# Patient Record
Sex: FEMALE | Race: BLACK OR AFRICAN AMERICAN | Employment: FULL TIME | URBAN - METROPOLITAN AREA
[De-identification: names, ages, dates, MRNs, and addresses within clinical notes are randomized per-mention and may not be internally consistent; named-entity substitution may affect disease eponyms.]

---

## 2017-10-25 ENCOUNTER — OFFICE VISIT (OUTPATIENT)
Dept: FAMILY MEDICINE CLINIC | Facility: CLINIC | Age: 47
End: 2017-10-25

## 2017-10-25 VITALS
BODY MASS INDEX: 42.16 KG/M2 | WEIGHT: 250 LBS | HEIGHT: 64.75 IN | SYSTOLIC BLOOD PRESSURE: 136 MMHG | DIASTOLIC BLOOD PRESSURE: 74 MMHG | HEART RATE: 88 BPM

## 2017-10-25 DIAGNOSIS — Z00.00 ROUTINE GENERAL MEDICAL EXAMINATION AT A HEALTH CARE FACILITY: Primary | ICD-10-CM

## 2017-10-25 DIAGNOSIS — M54.50 ACUTE BILATERAL LOW BACK PAIN WITHOUT SCIATICA: ICD-10-CM

## 2017-10-25 DIAGNOSIS — E11.9 TYPE 2 DIABETES MELLITUS WITHOUT COMPLICATION, WITHOUT LONG-TERM CURRENT USE OF INSULIN (HCC): ICD-10-CM

## 2017-10-25 DIAGNOSIS — M53.3 SACRAL PAIN: ICD-10-CM

## 2017-10-25 PROCEDURE — 99386 PREV VISIT NEW AGE 40-64: CPT | Performed by: FAMILY MEDICINE

## 2017-10-25 PROCEDURE — 99203 OFFICE O/P NEW LOW 30 MIN: CPT | Performed by: FAMILY MEDICINE

## 2017-10-25 RX ORDER — GLIPIZIDE 5 MG/1
5 TABLET ORAL
COMMUNITY
End: 2018-04-11 | Stop reason: ALTCHOICE

## 2017-10-25 NOTE — PATIENT INSTRUCTIONS
-- call to schedule xray  ibuprofen 600mg-800mg up to 3x/day with meals as needed for pain (do not use for prolonged period)      -- come in for fasting bloodwork anytime that you are able to (8-10h no food, only water; lab here is open M-F, 8am-12)  --

## 2017-10-25 NOTE — PROGRESS NOTES
Abby Kaiser is a 52year old female who is here for Patient presents with:  Physical  Pain: burning pain in tailbone started a month ago       HPI:     Here for physical    Has pain in tailbone  -works as a  - was out for some time due to Natty Bledsoe status: Never Smoker                                                              Smokeless tobacco: Never Used                      Alcohol use:  No                    EXAM:   /74 (BP Location: Left arm, Patient Position: Sitting, Cuff Size: large) Visit:    Orders Placed This Encounter      Hemoglobin A1C [E]      CBC W Differential W Platelet [E]      Comp Metabolic Panel (14) [E]      Lipid Panel [E]      TSH W Reflex To Free T4 [E]      Vitamin D, 25-Hydroxy [E]    Meds This Visit:    No prescrip

## 2017-10-31 ENCOUNTER — HOSPITAL ENCOUNTER (OUTPATIENT)
Dept: GENERAL RADIOLOGY | Age: 47
Discharge: HOME | End: 2017-10-31
Attending: FAMILY MEDICINE
Payer: COMMERCIAL

## 2017-10-31 DIAGNOSIS — M53.3 SACRAL PAIN: ICD-10-CM

## 2017-10-31 DIAGNOSIS — M54.50 ACUTE BILATERAL LOW BACK PAIN WITHOUT SCIATICA: ICD-10-CM

## 2017-10-31 PROCEDURE — 72202 X-RAY EXAM SI JOINTS 3/> VWS: CPT | Performed by: FAMILY MEDICINE

## 2017-10-31 PROCEDURE — 72110 X-RAY EXAM L-2 SPINE 4/>VWS: CPT | Performed by: FAMILY MEDICINE

## 2017-11-08 ENCOUNTER — TELEPHONE (OUTPATIENT)
Dept: FAMILY MEDICINE CLINIC | Facility: CLINIC | Age: 47
End: 2017-11-08

## 2017-11-08 NOTE — TELEPHONE ENCOUNTER
----- Message from Yasmin Watson MD sent at 11/8/2017  2:04 PM CST -----  xrays look good, mild arthritis in lower back, nothing remarkable  -folllowup as planned

## 2017-11-17 ENCOUNTER — TELEPHONE (OUTPATIENT)
Dept: FAMILY MEDICINE CLINIC | Facility: CLINIC | Age: 47
End: 2017-11-17

## 2017-11-17 NOTE — TELEPHONE ENCOUNTER
Spoke to patient and she said her partner was positive for trichomoniasis  And he just got an antibiotic today from a previous doctor so pt is coming in on Monday to see Dr. Kylie Tong and she wants to be tested as well.

## 2017-11-17 NOTE — TELEPHONE ENCOUNTER
Emily Cartagena was just in with a appt with Lauran Habermann with Dr Oswald Aviles, she needs some testing done on Monday, she would like to speak with a nurse regarding this matter.  Please call Emily Cartagena at [phone]

## 2017-11-20 ENCOUNTER — OFFICE VISIT (OUTPATIENT)
Dept: FAMILY MEDICINE CLINIC | Facility: CLINIC | Age: 47
End: 2017-11-20

## 2017-11-20 VITALS
HEIGHT: 64.75 IN | HEART RATE: 80 BPM | BODY MASS INDEX: 41.99 KG/M2 | RESPIRATION RATE: 16 BRPM | TEMPERATURE: 98 F | WEIGHT: 249 LBS | SYSTOLIC BLOOD PRESSURE: 132 MMHG | DIASTOLIC BLOOD PRESSURE: 80 MMHG

## 2017-11-20 DIAGNOSIS — R10.2 SUPRAPUBIC PAIN: ICD-10-CM

## 2017-11-20 DIAGNOSIS — M54.50 CHRONIC BILATERAL LOW BACK PAIN WITHOUT SCIATICA: Primary | ICD-10-CM

## 2017-11-20 DIAGNOSIS — Z11.3 ROUTINE SCREENING FOR STI (SEXUALLY TRANSMITTED INFECTION): ICD-10-CM

## 2017-11-20 DIAGNOSIS — G89.29 CHRONIC BILATERAL LOW BACK PAIN WITHOUT SCIATICA: Primary | ICD-10-CM

## 2017-11-20 DIAGNOSIS — Z20.2 EXPOSURE TO TRICHOMONAS: ICD-10-CM

## 2017-11-20 PROCEDURE — 87086 URINE CULTURE/COLONY COUNT: CPT | Performed by: FAMILY MEDICINE

## 2017-11-20 PROCEDURE — 99214 OFFICE O/P EST MOD 30 MIN: CPT | Performed by: FAMILY MEDICINE

## 2017-11-20 RX ORDER — METRONIDAZOLE 500 MG/1
500 TABLET ORAL 2 TIMES DAILY
Qty: 14 TABLET | Refills: 0 | Status: SHIPPED | OUTPATIENT
Start: 2017-11-20 | End: 2018-02-27 | Stop reason: ALTCHOICE

## 2017-11-20 NOTE — PATIENT INSTRUCTIONS
Start anitbiotics  Protection for next 2 weeks  We will call with results in next couple of days  Followup if symptoms not improving  Call to schedule PT (call insurance first)    [address]    Lab appointments can now be gabriele

## 2017-11-20 NOTE — PROGRESS NOTES
Margo Rolle is a 52year old female here for Patient presents with:  STD: Room 1. Bloodwork , std testing. Sympt- pain in her lower abd , anal pain as well.  No flu vaccine       HPI:     Trichomonas exposure  -partner recently diagnosed  -has not been h CTAB, no wheezing  CV: RRR, no murmurs  Abd: soft, ND, mild suprapubic discomfort, +BS  Ext: full ROM  Psych: normal affect     ASSESSMENT/PLAN:     1. Chronic bilateral low back pain without sciatica  - OP REFERRAL TO [facility]    2.

## 2017-11-24 ENCOUNTER — TELEPHONE (OUTPATIENT)
Dept: FAMILY MEDICINE CLINIC | Facility: CLINIC | Age: 47
End: 2017-11-24

## 2017-11-24 NOTE — TELEPHONE ENCOUNTER
Pt informed of test results and recommendations. Pt verbalized understanding and had no questions at this time.

## 2017-11-24 NOTE — TELEPHONE ENCOUNTER
----- Message from Art Shaw MD sent at 11/23/2017  7:26 PM CST -----  Negative culture and urine tests  -f/u if symptoms not improving or worsening

## 2017-12-28 ENCOUNTER — TELEPHONE (OUTPATIENT)
Dept: FAMILY MEDICINE CLINIC | Facility: CLINIC | Age: 47
End: 2017-12-28

## 2017-12-28 NOTE — TELEPHONE ENCOUNTER
rx approved qty 30 days NR  Patient needs to complete labs for any further refills  Copy of lab orders mailed to patient with note to complete within 30 days for further refills of her medications

## 2018-02-27 ENCOUNTER — OFFICE VISIT (OUTPATIENT)
Dept: FAMILY MEDICINE CLINIC | Facility: CLINIC | Age: 48
End: 2018-02-27

## 2018-02-27 ENCOUNTER — LAB ENCOUNTER (OUTPATIENT)
Dept: LAB | Age: 48
End: 2018-02-27
Attending: FAMILY MEDICINE
Payer: COMMERCIAL

## 2018-02-27 VITALS
BODY MASS INDEX: 41.49 KG/M2 | HEIGHT: 64.5 IN | DIASTOLIC BLOOD PRESSURE: 70 MMHG | HEART RATE: 80 BPM | SYSTOLIC BLOOD PRESSURE: 134 MMHG | WEIGHT: 246 LBS

## 2018-02-27 DIAGNOSIS — E11.9 TYPE 2 DIABETES MELLITUS WITHOUT COMPLICATION, WITHOUT LONG-TERM CURRENT USE OF INSULIN (HCC): Primary | ICD-10-CM

## 2018-02-27 DIAGNOSIS — Z00.00 ROUTINE GENERAL MEDICAL EXAMINATION AT A HEALTH CARE FACILITY: ICD-10-CM

## 2018-02-27 DIAGNOSIS — Z11.3 ROUTINE SCREENING FOR STI (SEXUALLY TRANSMITTED INFECTION): ICD-10-CM

## 2018-02-27 DIAGNOSIS — J01.10 ACUTE FRONTAL SINUSITIS, RECURRENCE NOT SPECIFIED: ICD-10-CM

## 2018-02-27 DIAGNOSIS — E11.9 TYPE 2 DIABETES MELLITUS WITHOUT COMPLICATION, WITHOUT LONG-TERM CURRENT USE OF INSULIN (HCC): ICD-10-CM

## 2018-02-27 DIAGNOSIS — R35.0 URINARY FREQUENCY: ICD-10-CM

## 2018-02-27 LAB
25-HYDROXYVITAMIN D (TOTAL): 17.6 NG/ML (ref 30–100)
ALBUMIN SERPL-MCNC: 3.6 G/DL (ref 3.5–4.8)
ALP LIVER SERPL-CCNC: 86 U/L (ref 39–100)
ALT SERPL-CCNC: 13 U/L (ref 14–54)
AST SERPL-CCNC: 6 U/L (ref 15–41)
BASOPHILS # BLD AUTO: 0.04 X10(3) UL (ref 0–0.1)
BASOPHILS NFR BLD AUTO: 0.6 %
BILIRUB SERPL-MCNC: 0.3 MG/DL (ref 0.1–2)
BUN BLD-MCNC: 7 MG/DL (ref 8–20)
CALCIUM BLD-MCNC: 9.5 MG/DL (ref 8.3–10.3)
CHLORIDE: 99 MMOL/L (ref 101–111)
CO2: 28 MMOL/L (ref 22–32)
CREAT BLD-MCNC: 0.77 MG/DL (ref 0.55–1.02)
EOSINOPHIL # BLD AUTO: 0.09 X10(3) UL (ref 0–0.3)
EOSINOPHIL NFR BLD AUTO: 1.4 %
ERYTHROCYTE [DISTWIDTH] IN BLOOD BY AUTOMATED COUNT: 13.6 % (ref 11.5–16)
EST. AVERAGE GLUCOSE BLD GHB EST-MCNC: 324 MG/DL (ref 68–126)
GLUCOSE BLD-MCNC: 298 MG/DL (ref 70–99)
HBA1C MFR BLD HPLC: 12.9 % (ref ?–5.7)
HCT VFR BLD AUTO: 40.3 % (ref 34–50)
HGB BLD-MCNC: 12.7 G/DL (ref 12–16)
IMMATURE GRANULOCYTE COUNT: 0.02 X10(3) UL (ref 0–1)
IMMATURE GRANULOCYTE RATIO %: 0.3 %
LYMPHOCYTES # BLD AUTO: 2.25 X10(3) UL (ref 0.9–4)
LYMPHOCYTES NFR BLD AUTO: 36 %
M PROTEIN MFR SERPL ELPH: 7.8 G/DL (ref 6.1–8.3)
MCH RBC QN AUTO: 26.1 PG (ref 27–33.2)
MCHC RBC AUTO-ENTMCNC: 31.5 G/DL (ref 31–37)
MCV RBC AUTO: 82.9 FL (ref 81–100)
MONOCYTES # BLD AUTO: 0.51 X10(3) UL (ref 0.1–1)
MONOCYTES NFR BLD AUTO: 8.2 %
NEUTROPHIL ABS PRELIM: 3.34 X10 (3) UL (ref 1.3–6.7)
NEUTROPHILS # BLD AUTO: 3.34 X10(3) UL (ref 1.3–6.7)
NEUTROPHILS NFR BLD AUTO: 53.5 %
PLATELET # BLD AUTO: 260 10(3)UL (ref 150–450)
POTASSIUM SERPL-SCNC: 4.1 MMOL/L (ref 3.6–5.1)
RBC # BLD AUTO: 4.86 X10(6)UL (ref 3.8–5.1)
RED CELL DISTRIBUTION WIDTH-SD: 41.2 FL (ref 35.1–46.3)
SODIUM SERPL-SCNC: 136 MMOL/L (ref 136–144)
T PALLIDUM AB SER QL IA: NONREACTIVE
TSI SER-ACNC: 1.3 MIU/ML (ref 0.35–5.5)
WBC # BLD AUTO: 6.3 X10(3) UL (ref 4–13)

## 2018-02-27 PROCEDURE — 87389 HIV-1 AG W/HIV-1&-2 AB AG IA: CPT | Performed by: FAMILY MEDICINE

## 2018-02-27 PROCEDURE — 36415 COLL VENOUS BLD VENIPUNCTURE: CPT | Performed by: FAMILY MEDICINE

## 2018-02-27 PROCEDURE — 83036 HEMOGLOBIN GLYCOSYLATED A1C: CPT | Performed by: FAMILY MEDICINE

## 2018-02-27 PROCEDURE — 82306 VITAMIN D 25 HYDROXY: CPT | Performed by: FAMILY MEDICINE

## 2018-02-27 PROCEDURE — 86780 TREPONEMA PALLIDUM: CPT | Performed by: FAMILY MEDICINE

## 2018-02-27 PROCEDURE — 80050 GENERAL HEALTH PANEL: CPT | Performed by: FAMILY MEDICINE

## 2018-02-27 PROCEDURE — 99214 OFFICE O/P EST MOD 30 MIN: CPT | Performed by: FAMILY MEDICINE

## 2018-02-27 RX ORDER — FLUTICASONE PROPIONATE 50 MCG
2 SPRAY, SUSPENSION (ML) NASAL DAILY
Qty: 1 BOTTLE | Refills: 2 | Status: SHIPPED | OUTPATIENT
Start: 2018-02-27 | End: 2018-04-11

## 2018-02-27 RX ORDER — AMOXICILLIN AND CLAVULANATE POTASSIUM 875; 125 MG/1; MG/1
1 TABLET, FILM COATED ORAL 2 TIMES DAILY
Qty: 14 TABLET | Refills: 0 | Status: SHIPPED | OUTPATIENT
Start: 2018-02-27 | End: 2018-03-19

## 2018-02-27 NOTE — PATIENT INSTRUCTIONS
-- we will call with bloodwork results  -- continue metformin 3x/day  -- push fluids  -- start otc generic claritin or zyrtec daily  -- start flonase nasal spray daily  -- if not improving or worsening, start amoxicillin

## 2018-02-27 NOTE — PROGRESS NOTES
Jayla Koo is a 50year old female here for Patient presents with:  Diabetes  URI: headache, runny nose x 2 weeks   Vaginal Problem: urinary frequency, vaginal itching       HPI:     1.  Type 2 diabetes mellitus without complication, without long-term c Denies  --NEURO: Denies  --PSYCH: Denies  --HEME/LYMPH/IMMUN: Denies  --ENDO: Denies  --SKIN: Denies  All other systems reviewed are negative    PHYSICAL EXAM:   /70 (BP Location: Left arm, Patient Position: Sitting, Cuff Size: large)   Pulse 80   Ht

## 2018-03-07 ENCOUNTER — TELEPHONE (OUTPATIENT)
Dept: FAMILY MEDICINE CLINIC | Facility: CLINIC | Age: 48
End: 2018-03-07

## 2018-03-07 DIAGNOSIS — E55.9 VITAMIN D DEFICIENCY: Primary | ICD-10-CM

## 2018-03-07 RX ORDER — ERGOCALCIFEROL 1.25 MG/1
50000 CAPSULE ORAL WEEKLY
Qty: 12 CAPSULE | Refills: 0 | Status: SHIPPED | OUTPATIENT
Start: 2018-03-07 | End: 2018-06-05

## 2018-03-07 NOTE — TELEPHONE ENCOUNTER
----- Message from Jasiel Francisco MD sent at 3/6/2018  7:50 PM CST -----  Significantly elevated sugars - make appt to further discuss  -Vit D low - Vit D 50,000 units weekly x 12 wks, then recheck Vit D upon completion  -sti panel negative

## 2018-03-07 NOTE — TELEPHONE ENCOUNTER
lmom for pt with results/instructions to call office to schedule appt. Med sent to pharmacy and lab in system.

## 2018-03-07 NOTE — PROGRESS NOTES
Significantly elevated sugars - make appt to further discuss  -Vit D low - Vit D 50,000 units weekly x 12 wks, then recheck Vit D upon completion  -sti panel negative

## 2018-03-13 ENCOUNTER — TELEPHONE (OUTPATIENT)
Dept: FAMILY MEDICINE CLINIC | Facility: CLINIC | Age: 48
End: 2018-03-13

## 2018-03-13 NOTE — TELEPHONE ENCOUNTER
Pt states she was here last week and she said Dr. Rema Cash told her to call if she wasn't feeling better and he would call in an antibiotic, she said has taken amoxicillin in the past and that has worked for her. She also said the Flonase is not working.

## 2018-03-13 NOTE — TELEPHONE ENCOUNTER
Patient was given a printed rx at her ov on 2/27/18 for augmentin  Left message for patient that she was given a printed prescription that she will need to take to her pharmacy

## 2018-03-19 ENCOUNTER — TELEPHONE (OUTPATIENT)
Dept: FAMILY MEDICINE CLINIC | Facility: CLINIC | Age: 48
End: 2018-03-19

## 2018-03-19 RX ORDER — ACETAMINOPHEN AND CODEINE PHOSPHATE 300; 30 MG/1; MG/1
TABLET ORAL
Refills: 0 | COMMUNITY
Start: 2017-12-26 | End: 2018-04-25 | Stop reason: ALTCHOICE

## 2018-03-19 RX ORDER — AMOXICILLIN AND CLAVULANATE POTASSIUM 875; 125 MG/1; MG/1
1 TABLET, FILM COATED ORAL 2 TIMES DAILY
Qty: 14 TABLET | Refills: 0 | Status: SHIPPED | OUTPATIENT
Start: 2018-03-19 | End: 2018-03-26

## 2018-03-19 NOTE — TELEPHONE ENCOUNTER
rx for augmentin sent to [facility]   Left message for patient that rx has been sent to [facility]

## 2018-03-19 NOTE — TELEPHONE ENCOUNTER
Letty Viramontes is calling to see if Dr Edi Casillas can call in a prescription for Ginette's antibiotic, she lost the paper one she had, she would like it to go to the [place] [phone] in [facility].

## 2018-04-11 ENCOUNTER — OFFICE VISIT (OUTPATIENT)
Dept: FAMILY MEDICINE CLINIC | Facility: CLINIC | Age: 48
End: 2018-04-11

## 2018-04-11 VITALS
HEART RATE: 88 BPM | BODY MASS INDEX: 40.98 KG/M2 | DIASTOLIC BLOOD PRESSURE: 72 MMHG | WEIGHT: 243 LBS | HEIGHT: 64.5 IN | SYSTOLIC BLOOD PRESSURE: 138 MMHG

## 2018-04-11 DIAGNOSIS — E11.65 TYPE 2 DIABETES MELLITUS WITH HYPERGLYCEMIA, WITHOUT LONG-TERM CURRENT USE OF INSULIN (HCC): Primary | ICD-10-CM

## 2018-04-11 DIAGNOSIS — R00.2 PALPITATIONS: ICD-10-CM

## 2018-04-11 DIAGNOSIS — J01.10 ACUTE FRONTAL SINUSITIS, RECURRENCE NOT SPECIFIED: ICD-10-CM

## 2018-04-11 PROCEDURE — 99214 OFFICE O/P EST MOD 30 MIN: CPT | Performed by: FAMILY MEDICINE

## 2018-04-11 RX ORDER — GLIMEPIRIDE 1 MG/1
1 TABLET ORAL
Qty: 90 TABLET | Refills: 1 | Status: SHIPPED | OUTPATIENT
Start: 2018-04-11 | End: 2018-04-25 | Stop reason: ALTCHOICE

## 2018-04-11 RX ORDER — FLUTICASONE PROPIONATE 50 MCG
2 SPRAY, SUSPENSION (ML) NASAL DAILY
Qty: 1 BOTTLE | Refills: 2 | Status: SHIPPED | OUTPATIENT
Start: 2018-04-11 | End: 2018-07-10

## 2018-04-11 NOTE — PROGRESS NOTES
Yesenia Remy is a 50year old female here for Patient presents with:  Diabetes: Patient needs foot exam  Rapid Heart Beat: Mostly at night       HPI:     DM  -uncontrolled - a1c 12.9  -had stopped glipizide  -could limit juice in diet  -due for foot exam Oral Cap Take 1 capsule (50,000 Units total) by mouth once a week. Disp: 12 capsule Rfl: 0   MetFORMIN HCl 1000 MG Oral Tab Take 1 tablet (1,000 mg total) by mouth 2 (two) times daily.  Disp: 60 tablet Rfl: 0       Allergies:  No Known Allergies      ROS: breakfast.      Blood Glucose Monitoring Suppl (ERIKA CONTOUR NEXT EZ) w/Device Does not apply Kit 1 kit 0      Si Device by Other route 2 (two) times daily.  Dx: E11.6      Glucose Blood (ERIKA CONTOUR NEXT TEST) In Vitro Strip 100 strip 2      Sig: Ch

## 2018-04-25 ENCOUNTER — OFFICE VISIT (OUTPATIENT)
Dept: FAMILY MEDICINE CLINIC | Facility: CLINIC | Age: 48
End: 2018-04-25

## 2018-04-25 VITALS
HEIGHT: 64.5 IN | WEIGHT: 244 LBS | HEART RATE: 80 BPM | DIASTOLIC BLOOD PRESSURE: 70 MMHG | SYSTOLIC BLOOD PRESSURE: 128 MMHG | BODY MASS INDEX: 41.15 KG/M2

## 2018-04-25 DIAGNOSIS — R10.32 LLQ PAIN: Primary | ICD-10-CM

## 2018-04-25 DIAGNOSIS — E11.65 TYPE 2 DIABETES MELLITUS WITH HYPERGLYCEMIA, WITHOUT LONG-TERM CURRENT USE OF INSULIN (HCC): ICD-10-CM

## 2018-04-25 PROCEDURE — 99214 OFFICE O/P EST MOD 30 MIN: CPT | Performed by: FAMILY MEDICINE

## 2018-04-25 RX ORDER — GLIMEPIRIDE 2 MG/1
2 TABLET ORAL
Qty: 90 TABLET | Refills: 1 | Status: SHIPPED | OUTPATIENT
Start: 2018-04-25 | End: 2018-07-31

## 2018-04-25 RX ORDER — ACETAMINOPHEN AND CODEINE PHOSPHATE 300; 30 MG/1; MG/1
1 TABLET ORAL EVERY 4 HOURS PRN
Qty: 20 TABLET | Refills: 0 | Status: SHIPPED | OUTPATIENT
Start: 2018-04-25 | End: 2018-12-14

## 2018-04-25 NOTE — PATIENT INSTRUCTIONS
-- referral dept will call once CT scan is approved, try to call this afternoon to schedule and confirm with  that CT approved before making appt  -- tylenol with codeine as needed  -- increase glimeperide to 2mg daily each morning with food  --

## 2018-04-25 NOTE — PROGRESS NOTES
Cathy He is a 50year old female here for Patient presents with: Follow - Up: Follow up on blood sugars. Abdominal Pain: Low left abdominal pain x 4 days       HPI:     1.  LLQ pain  -started 4 days ago  -sharp pain  -comes and goes  -slight improv Does not apply Misc 1 lancet by Finger stick route daily. Dx: E11.6 Disp: 1 Box Rfl: 2   ergocalciferol 57780 units Oral Cap Take 1 capsule (50,000 Units total) by mouth once a week.  Disp: 12 capsule Rfl: 0   MetFORMIN HCl 1000 MG Oral Tab Take 1 tablet (1 Imaging & Referrals:  CT ABDOMEN+PELVIS(CONTRAST ONLY)(BXP=[id number])     Candace Healy MD

## 2018-05-23 ENCOUNTER — TELEPHONE (OUTPATIENT)
Dept: FAMILY MEDICINE CLINIC | Facility: CLINIC | Age: 48
End: 2018-05-23

## 2018-05-23 NOTE — TELEPHONE ENCOUNTER
I left a message on the patient's confidential voice mail stating that I am calling to inform her that she is due for a diabetic eye exam. I asked the patient to return my call at her earliest convenience.

## 2018-06-11 NOTE — TELEPHONE ENCOUNTER
I left a message on the patient's confidential voice mail stating that I am calling to remind her that she is due for her diabetic eye exam, if she hasn't completed it already.  I asked her to call me back at her earliest convenience so that I could place a

## 2018-07-31 ENCOUNTER — LAB ENCOUNTER (OUTPATIENT)
Dept: LAB | Age: 48
End: 2018-07-31
Attending: FAMILY MEDICINE
Payer: COMMERCIAL

## 2018-07-31 ENCOUNTER — OFFICE VISIT (OUTPATIENT)
Dept: FAMILY MEDICINE CLINIC | Facility: CLINIC | Age: 48
End: 2018-07-31
Payer: COMMERCIAL

## 2018-07-31 VITALS
DIASTOLIC BLOOD PRESSURE: 66 MMHG | BODY MASS INDEX: 41.15 KG/M2 | HEIGHT: 64.5 IN | WEIGHT: 244 LBS | SYSTOLIC BLOOD PRESSURE: 128 MMHG | HEART RATE: 78 BPM

## 2018-07-31 DIAGNOSIS — E11.65 TYPE 2 DIABETES MELLITUS WITH HYPERGLYCEMIA, WITHOUT LONG-TERM CURRENT USE OF INSULIN (HCC): Primary | ICD-10-CM

## 2018-07-31 DIAGNOSIS — M25.531 RIGHT WRIST PAIN: ICD-10-CM

## 2018-07-31 DIAGNOSIS — L85.3 DRY SKIN: ICD-10-CM

## 2018-07-31 DIAGNOSIS — E55.9 VITAMIN D DEFICIENCY: ICD-10-CM

## 2018-07-31 DIAGNOSIS — E11.65 TYPE 2 DIABETES MELLITUS WITH HYPERGLYCEMIA, WITHOUT LONG-TERM CURRENT USE OF INSULIN (HCC): ICD-10-CM

## 2018-07-31 LAB
ALBUMIN SERPL-MCNC: 3.3 G/DL (ref 3.5–4.8)
ALBUMIN/GLOB SERPL: 0.9 {RATIO} (ref 1–2)
ALP LIVER SERPL-CCNC: 85 U/L (ref 39–100)
ALT SERPL-CCNC: 20 U/L (ref 14–54)
ANION GAP SERPL CALC-SCNC: 9 MMOL/L (ref 0–18)
AST SERPL-CCNC: 12 U/L (ref 15–41)
BILIRUB SERPL-MCNC: 0.4 MG/DL (ref 0.1–2)
BUN BLD-MCNC: 7 MG/DL (ref 8–20)
BUN/CREAT SERPL: 9.5 (ref 10–20)
CALCIUM BLD-MCNC: 9 MG/DL (ref 8.3–10.3)
CHLORIDE SERPL-SCNC: 103 MMOL/L (ref 101–111)
CO2 SERPL-SCNC: 26 MMOL/L (ref 22–32)
CREAT BLD-MCNC: 0.74 MG/DL (ref 0.55–1.02)
CREAT UR-SCNC: <13 MG/DL
EST. AVERAGE GLUCOSE BLD GHB EST-MCNC: 335 MG/DL (ref 68–126)
GLOBULIN PLAS-MCNC: 3.7 G/DL (ref 2.5–3.7)
GLUCOSE BLD-MCNC: 247 MG/DL (ref 70–99)
HBA1C MFR BLD HPLC: 13.3 % (ref ?–5.7)
M PROTEIN MFR SERPL ELPH: 7 G/DL (ref 6.1–8.3)
MICROALBUMIN UR-MCNC: <0.5 MG/DL
OSMOLALITY SERPL CALC.SUM OF ELEC: 292 MOSM/KG (ref 275–295)
POTASSIUM SERPL-SCNC: 3.9 MMOL/L (ref 3.6–5.1)
SODIUM SERPL-SCNC: 138 MMOL/L (ref 136–144)
VIT D+METAB SERPL-MCNC: 15.7 NG/ML (ref 30–100)

## 2018-07-31 PROCEDURE — 83036 HEMOGLOBIN GLYCOSYLATED A1C: CPT | Performed by: FAMILY MEDICINE

## 2018-07-31 PROCEDURE — 82306 VITAMIN D 25 HYDROXY: CPT | Performed by: FAMILY MEDICINE

## 2018-07-31 PROCEDURE — 36415 COLL VENOUS BLD VENIPUNCTURE: CPT | Performed by: FAMILY MEDICINE

## 2018-07-31 PROCEDURE — 99214 OFFICE O/P EST MOD 30 MIN: CPT | Performed by: FAMILY MEDICINE

## 2018-07-31 PROCEDURE — 82570 ASSAY OF URINE CREATININE: CPT | Performed by: FAMILY MEDICINE

## 2018-07-31 PROCEDURE — 80053 COMPREHEN METABOLIC PANEL: CPT | Performed by: FAMILY MEDICINE

## 2018-07-31 PROCEDURE — 82043 UR ALBUMIN QUANTITATIVE: CPT | Performed by: FAMILY MEDICINE

## 2018-07-31 RX ORDER — GLIMEPIRIDE 2 MG/1
2 TABLET ORAL
Qty: 90 TABLET | Refills: 1 | Status: SHIPPED | OUTPATIENT
Start: 2018-07-31 | End: 2018-12-14

## 2018-07-31 RX ORDER — CYCLOBENZAPRINE HCL 10 MG
10 TABLET ORAL 3 TIMES DAILY
Qty: 30 TABLET | Refills: 1 | Status: SHIPPED | OUTPATIENT
Start: 2018-07-31 | End: 2018-08-20

## 2018-07-31 NOTE — PATIENT INSTRUCTIONS
Try over the counter nasonex or generic instead of flonase    Limit hot water and length of shower  Use vaseline on damp skin to seal in moisture in dry areas  Triamcinolone (instead of hydrocortisone) as needed    We will call with sugar levels    Back

## 2018-07-31 NOTE — PROGRESS NOTES
Wilmer Salomon is a 50year old female here for Patient presents with:  Diabetes  Derm Problem: dry skin. Pain: Right thumb pain       HPI:       1.  Type 2 diabetes mellitus with hyperglycemia, without long-term current use of insulin ([facility] [address]  -due for labs Box Rfl: 2   MetFORMIN HCl 1000 MG Oral Tab Take 1 tablet (1,000 mg total) by mouth 2 (two) times daily. Disp: 60 tablet Rfl: 0   Acetaminophen-Codeine #3 300-30 MG Oral Tab Take 1 tablet by mouth every 4 (four) hours as needed for Pain.  Disp: 20 tablet Rf

## 2018-08-05 NOTE — PROGRESS NOTES
Sugars very elevated, double up on glimeperide to 4mg every morning (always take with food)  Check sugars fasting every morning  Followup in 2 wks to recheck

## 2018-08-06 ENCOUNTER — TELEPHONE (OUTPATIENT)
Dept: FAMILY MEDICINE CLINIC | Facility: CLINIC | Age: 48
End: 2018-08-06

## 2018-08-06 NOTE — TELEPHONE ENCOUNTER
----- Message from Pascale Ball MD sent at 8/5/2018  4:32 PM CDT -----  Sugars very elevated, double up on glimeperide to 4mg every morning (always take with food)  Check sugars fasting every morning  Followup in 2 wks to recheck

## 2018-08-08 NOTE — TELEPHONE ENCOUNTER
Spoke to patient and she said she has only been taking 1mg of the glimepiride every morning with her Metformin so do you want her to start taking 2mg?     thanks

## 2018-09-27 ENCOUNTER — PATIENT OUTREACH (OUTPATIENT)
Dept: FAMILY MEDICINE CLINIC | Facility: CLINIC | Age: 48
End: 2018-09-27

## 2018-09-27 DIAGNOSIS — E11.65 TYPE 2 DIABETES MELLITUS WITH HYPERGLYCEMIA, WITHOUT LONG-TERM CURRENT USE OF INSULIN (HCC): Primary | ICD-10-CM

## 2018-09-27 NOTE — PROGRESS NOTES
I informed the patient that I am calling to remind her that she is due for her diabetic eye exam. The patient states that she will schedule an appointment with her ophthalmologist and have them fax the results to our office.

## 2018-11-01 ENCOUNTER — TELEPHONE (OUTPATIENT)
Dept: FAMILY MEDICINE CLINIC | Facility: CLINIC | Age: 48
End: 2018-11-01

## 2018-12-14 ENCOUNTER — OFFICE VISIT (OUTPATIENT)
Dept: FAMILY MEDICINE CLINIC | Facility: CLINIC | Age: 48
End: 2018-12-14
Payer: COMMERCIAL

## 2018-12-14 ENCOUNTER — HOSPITAL ENCOUNTER (OUTPATIENT)
Dept: CT IMAGING | Age: 48
Discharge: HOME | End: 2018-12-14
Attending: FAMILY MEDICINE
Payer: COMMERCIAL

## 2018-12-14 ENCOUNTER — TELEPHONE (OUTPATIENT)
Dept: FAMILY MEDICINE CLINIC | Facility: CLINIC | Age: 48
End: 2018-12-14

## 2018-12-14 VITALS
SYSTOLIC BLOOD PRESSURE: 138 MMHG | HEIGHT: 64.2 IN | HEART RATE: 82 BPM | WEIGHT: 240 LBS | DIASTOLIC BLOOD PRESSURE: 82 MMHG | BODY MASS INDEX: 40.97 KG/M2

## 2018-12-14 DIAGNOSIS — F41.9 ANXIETY: ICD-10-CM

## 2018-12-14 DIAGNOSIS — R10.32 LLQ PAIN: ICD-10-CM

## 2018-12-14 DIAGNOSIS — R30.9 PAINFUL URINATION: Primary | ICD-10-CM

## 2018-12-14 PROCEDURE — 87086 URINE CULTURE/COLONY COUNT: CPT | Performed by: FAMILY MEDICINE

## 2018-12-14 PROCEDURE — 81003 URINALYSIS AUTO W/O SCOPE: CPT | Performed by: FAMILY MEDICINE

## 2018-12-14 PROCEDURE — 82565 ASSAY OF CREATININE: CPT

## 2018-12-14 PROCEDURE — 99215 OFFICE O/P EST HI 40 MIN: CPT | Performed by: FAMILY MEDICINE

## 2018-12-14 PROCEDURE — 74177 CT ABD & PELVIS W/CONTRAST: CPT | Performed by: FAMILY MEDICINE

## 2018-12-14 RX ORDER — CIPROFLOXACIN 500 MG/1
500 TABLET, FILM COATED ORAL 2 TIMES DAILY
Qty: 14 TABLET | Refills: 0 | Status: SHIPPED | OUTPATIENT
Start: 2018-12-14 | End: 2019-02-27 | Stop reason: ALTCHOICE

## 2018-12-14 RX ORDER — ACETAMINOPHEN AND CODEINE PHOSPHATE 300; 30 MG/1; MG/1
1 TABLET ORAL EVERY 4 HOURS PRN
Qty: 20 TABLET | Refills: 0 | Status: SHIPPED | OUTPATIENT
Start: 2018-12-14 | End: 2019-03-20 | Stop reason: ALTCHOICE

## 2018-12-14 RX ORDER — GLIMEPIRIDE 2 MG/1
2 TABLET ORAL
Qty: 90 TABLET | Refills: 1 | Status: SHIPPED | OUTPATIENT
Start: 2018-12-14 | End: 2019-10-21

## 2018-12-14 NOTE — PATIENT INSTRUCTIONS
-- navigator will call you for counseling  -- referral dept will call you once CT scan is approved  -- start antibiotics  -- we will call you after CT scan

## 2018-12-14 NOTE — PROGRESS NOTES
Called [employer] for approval and got the approval.  Pt is scheduled for [place] at 5:45pm but has to be there by 4:15p. Left the address as well for pt and left msg that she has to be NPO 2 hours prior to testing.   Asked pt after reviewing message to call

## 2018-12-14 NOTE — PROGRESS NOTES
Trae Shahid is a 50year old female here for Patient presents with:  Pelvic Pain: Left side x 1 week  Anxiety: anxiety attack 2 days ago  Painful Urination: x 1 week      HPI:       1. Painful urination  2.  LLQ pain  -started 1 wk ago  -associated with route 2 (two) times daily. Dx: E11.6 Disp: 1 kit Rfl: 0   Glucose Blood (ERIKA CONTOUR NEXT TEST) In Vitro Strip Check sugars once daily. Dx: E11.6 Disp: 100 strip Rfl: 2   ERIKA MICROLET LANCETS Does not apply Misc 1 lancet by Finger stick route daily.  D total) by mouth 2 (two) times daily.    • glimepiride 2 MG Oral Tab 90 tablet 1     Sig: Take 1 tablet (2 mg total) by mouth daily with breakfast.   • MetFORMIN HCl 1000 MG Oral Tab 180 tablet 1     Sig: Take 1 tablet (1,000 mg total) by mouth 2 (two) times

## 2018-12-18 ENCOUNTER — TELEPHONE (OUTPATIENT)
Dept: FAMILY MEDICINE CLINIC | Facility: CLINIC | Age: 48
End: 2018-12-18

## 2018-12-18 NOTE — TELEPHONE ENCOUNTER
----- Message from Pascale Ball MD sent at 12/17/2018  5:04 PM CST -----  CT scan negative - followup if not improving, go to ER if worsening    lmom for pt with results/instructions. Asked pt after reviewing message to call office with any questions.

## 2018-12-18 NOTE — TELEPHONE ENCOUNTER
Patient informed UC was negative  Patient is asking for a recommendation for OBGYN  Dr Iqra Noguera advise

## 2018-12-24 ENCOUNTER — OFFICE VISIT (OUTPATIENT)
Dept: FAMILY MEDICINE CLINIC | Facility: CLINIC | Age: 48
End: 2018-12-24
Payer: COMMERCIAL

## 2018-12-24 VITALS
HEIGHT: 64.2 IN | WEIGHT: 245.25 LBS | TEMPERATURE: 98 F | SYSTOLIC BLOOD PRESSURE: 132 MMHG | DIASTOLIC BLOOD PRESSURE: 76 MMHG | BODY MASS INDEX: 41.87 KG/M2 | HEART RATE: 80 BPM

## 2018-12-24 DIAGNOSIS — R10.2 PELVIC PAIN: ICD-10-CM

## 2018-12-24 DIAGNOSIS — R30.0 DYSURIA: ICD-10-CM

## 2018-12-24 DIAGNOSIS — R10.32 LLQ PAIN: Primary | ICD-10-CM

## 2018-12-24 DIAGNOSIS — Z01.419 WELL WOMAN EXAM: ICD-10-CM

## 2018-12-24 PROCEDURE — 99214 OFFICE O/P EST MOD 30 MIN: CPT | Performed by: FAMILY MEDICINE

## 2018-12-24 RX ORDER — NITROFURANTOIN 25; 75 MG/1; MG/1
100 CAPSULE ORAL 2 TIMES DAILY
Qty: 14 CAPSULE | Refills: 0 | Status: SHIPPED | OUTPATIENT
Start: 2018-12-24 | End: 2019-02-27 | Stop reason: ALTCHOICE

## 2018-12-24 RX ORDER — FLUCONAZOLE 150 MG/1
150 TABLET ORAL ONCE
Qty: 2 TABLET | Refills: 0 | Status: SHIPPED | OUTPATIENT
Start: 2018-12-24 | End: 2018-12-24

## 2018-12-24 NOTE — PROGRESS NOTES
Cathy He is a 50year old female here for Patient presents with:  Urinary Frequency: Follow up  Anxiety: anxiety attack yesterday      HPI:       1. LLQ pain  2. Dysuria  3.  Pelvic pain  -pain improved but not resolved with cipro  -abd CT was [place] Glucose Blood (ERIKA CONTOUR NEXT TEST) In Vitro Strip Check sugars once daily. Dx: E11.6 Disp: 100 strip Rfl: 2   ERIKA MICROLET LANCETS Does not apply Misc 1 lancet by Finger stick route daily.  Dx: E11.6 Disp: 1 Box Rfl: 2       Allergies:  No Known A

## 2018-12-24 NOTE — PATIENT INSTRUCTIONS
-- start probiotics OTC daily  -- push fluids  -- if pain not improving, start nitrofurantoin 2x/day with food  -- fluconazole as needed for yeast infection  -- call obgyn and urology for appts  -- followup if pain not improving - go to ER if worsening

## 2019-02-27 ENCOUNTER — OFFICE VISIT (OUTPATIENT)
Dept: FAMILY MEDICINE CLINIC | Facility: CLINIC | Age: 49
End: 2019-02-27
Payer: COMMERCIAL

## 2019-02-27 ENCOUNTER — TELEPHONE (OUTPATIENT)
Dept: FAMILY MEDICINE CLINIC | Facility: CLINIC | Age: 49
End: 2019-02-27

## 2019-02-27 ENCOUNTER — APPOINTMENT (OUTPATIENT)
Dept: LAB | Age: 49
End: 2019-02-27
Attending: FAMILY MEDICINE
Payer: COMMERCIAL

## 2019-02-27 VITALS
BODY MASS INDEX: 42.34 KG/M2 | HEIGHT: 64.2 IN | SYSTOLIC BLOOD PRESSURE: 138 MMHG | WEIGHT: 248 LBS | DIASTOLIC BLOOD PRESSURE: 70 MMHG

## 2019-02-27 DIAGNOSIS — M25.562 ACUTE PAIN OF BOTH KNEES: ICD-10-CM

## 2019-02-27 DIAGNOSIS — M25.561 ACUTE PAIN OF BOTH KNEES: ICD-10-CM

## 2019-02-27 DIAGNOSIS — G57.02 PIRIFORMIS SYNDROME, LEFT: ICD-10-CM

## 2019-02-27 DIAGNOSIS — E11.65 TYPE 2 DIABETES MELLITUS WITH HYPERGLYCEMIA, WITHOUT LONG-TERM CURRENT USE OF INSULIN (HCC): Primary | ICD-10-CM

## 2019-02-27 DIAGNOSIS — E11.65 TYPE 2 DIABETES MELLITUS WITH HYPERGLYCEMIA, WITHOUT LONG-TERM CURRENT USE OF INSULIN (HCC): ICD-10-CM

## 2019-02-27 LAB
ALBUMIN SERPL-MCNC: 3.4 G/DL (ref 3.4–5)
ALBUMIN/GLOB SERPL: 0.8 {RATIO} (ref 1–2)
ALP LIVER SERPL-CCNC: 89 U/L (ref 39–100)
ALT SERPL-CCNC: 18 U/L (ref 13–56)
ANION GAP SERPL CALC-SCNC: 7 MMOL/L (ref 0–18)
AST SERPL-CCNC: 14 U/L (ref 15–37)
BILIRUB SERPL-MCNC: 0.4 MG/DL (ref 0.1–2)
BUN BLD-MCNC: 9 MG/DL (ref 7–18)
BUN/CREAT SERPL: 11.7 (ref 10–20)
CALCIUM BLD-MCNC: 9.3 MG/DL (ref 8.5–10.1)
CHLORIDE SERPL-SCNC: 103 MMOL/L (ref 98–107)
CO2 SERPL-SCNC: 26 MMOL/L (ref 21–32)
CREAT BLD-MCNC: 0.77 MG/DL (ref 0.55–1.02)
EST. AVERAGE GLUCOSE BLD GHB EST-MCNC: 318 MG/DL (ref 68–126)
GLOBULIN PLAS-MCNC: 4.2 G/DL (ref 2.8–4.4)
GLUCOSE BLD-MCNC: 232 MG/DL (ref 70–99)
HBA1C MFR BLD HPLC: 12.7 % (ref ?–5.7)
M PROTEIN MFR SERPL ELPH: 7.6 G/DL (ref 6.4–8.2)
OSMOLALITY SERPL CALC.SUM OF ELEC: 288 MOSM/KG (ref 275–295)
POTASSIUM SERPL-SCNC: 4.4 MMOL/L (ref 3.5–5.1)
SODIUM SERPL-SCNC: 136 MMOL/L (ref 136–145)

## 2019-02-27 PROCEDURE — 83036 HEMOGLOBIN GLYCOSYLATED A1C: CPT | Performed by: FAMILY MEDICINE

## 2019-02-27 PROCEDURE — 36415 COLL VENOUS BLD VENIPUNCTURE: CPT | Performed by: FAMILY MEDICINE

## 2019-02-27 PROCEDURE — 80053 COMPREHEN METABOLIC PANEL: CPT | Performed by: FAMILY MEDICINE

## 2019-02-27 PROCEDURE — 99214 OFFICE O/P EST MOD 30 MIN: CPT | Performed by: FAMILY MEDICINE

## 2019-02-27 RX ORDER — IBUPROFEN 600 MG/1
600 TABLET ORAL EVERY 8 HOURS PRN
Qty: 50 TABLET | Refills: 0 | Status: SHIPPED | OUTPATIENT
Start: 2019-02-27 | End: 2019-10-21

## 2019-02-27 NOTE — TELEPHONE ENCOUNTER
Pt called states we will be receiving [place] paperwork that needs to get filled out and she wants to make sure Dr. Dominick Oh fill in the date since the initial visit that she was seen for the FMLA which was back in 08/2018.
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
## 2019-02-27 NOTE — TELEPHONE ENCOUNTER
Dr Juana Jones patient need an appt for this paperwork?     Paperwork is on Dr Patti Fernandes desk to be completed

## 2019-02-27 NOTE — PATIENT INSTRUCTIONS
-- start stretching, heat/ice as needed  ----ibuprofen 400mg-600mg 3x/day with food consistently for 2-3 days, then only as needed for pain (do not use for prolonged period)  -- would expect slow improvemtn    Start PT for low back pain    Followup in 1

## 2019-03-01 ENCOUNTER — TELEPHONE (OUTPATIENT)
Dept: FAMILY MEDICINE CLINIC | Facility: CLINIC | Age: 49
End: 2019-03-01

## 2019-03-01 NOTE — TELEPHONE ENCOUNTER
Faxed FMLA to [facility] [employer], Attention [facility]: [phone] [facility]:[id number]

## 2019-03-04 ENCOUNTER — TELEPHONE (OUTPATIENT)
Dept: FAMILY MEDICINE CLINIC | Facility: CLINIC | Age: 49
End: 2019-03-04

## 2019-03-04 RX ORDER — GLIMEPIRIDE 4 MG/1
TABLET ORAL
Qty: 90 TABLET | Refills: 0 | Status: SHIPPED | OUTPATIENT
Start: 2019-03-04 | End: 2019-03-20

## 2019-03-04 NOTE — TELEPHONE ENCOUNTER
Informed patient of Doctors instructions, she requested medication Glimiperide 4 mg qd to be send to Pharmacy. Patient rescheduled appointment for 03/20/2019 at 10:30 am. Patient verbalized understanding and will keep appointment and start medication.

## 2019-03-04 NOTE — TELEPHONE ENCOUNTER
----- Message from Blayne Niño MD sent at 3/3/2019 10:23 PM CST -----  Sugars very elevated still - increase glimiperide to 4mg daily (can double on what she is taking for now) - always take this with food  Check fasting sugars daily  See me in 2 wks t

## 2019-03-04 NOTE — TELEPHONE ENCOUNTER
Left message informing patient of lab results and 's instructions. I asked patient to call with any questions or concerns.  Patient has appointment scheduled for 03/27/2019 at 9:30 am. That appointment is in three weeks, Dr. Harry Barajas wants to see patie

## 2019-03-20 ENCOUNTER — OFFICE VISIT (OUTPATIENT)
Dept: FAMILY MEDICINE CLINIC | Facility: CLINIC | Age: 49
End: 2019-03-20
Payer: COMMERCIAL

## 2019-03-20 VITALS
WEIGHT: 241 LBS | HEIGHT: 64.37 IN | DIASTOLIC BLOOD PRESSURE: 70 MMHG | SYSTOLIC BLOOD PRESSURE: 132 MMHG | TEMPERATURE: 98 F | HEART RATE: 76 BPM | BODY MASS INDEX: 41.15 KG/M2

## 2019-03-20 DIAGNOSIS — E11.65 UNCONTROLLED TYPE 2 DIABETES MELLITUS WITH HYPERGLYCEMIA (HCC): Primary | ICD-10-CM

## 2019-03-20 DIAGNOSIS — G57.02 PIRIFORMIS SYNDROME, LEFT: ICD-10-CM

## 2019-03-20 PROCEDURE — 99214 OFFICE O/P EST MOD 30 MIN: CPT | Performed by: FAMILY MEDICINE

## 2019-03-20 NOTE — PROGRESS NOTES
Lorenzo Mullins is a 52year old female here for Patient presents with:  Lab Results: Follow up diabetes  and left pain follow up      HPI:       1.  Uncontrolled type 2 diabetes mellitus with hyperglycemia ([address]  -has tried to significantly change diet sinc Rfl: 2   ERIKA MICROLET LANCETS Does not apply Misc 1 lancet by Finger stick route daily.  Dx: E11.6 Disp: 1 Box Rfl: 2       Allergies:  No Known Allergies      ROS:     --GEN: Denies  --HEENT: Denies  --RESP: Denies  --CV: Denies  --GI: Denies  --: Keysha Mahoney

## 2019-03-20 NOTE — PATIENT INSTRUCTIONS
-- go to main [employer] website and sign up for coupon discount  ([email or web address])  -- continue all other meds    -- call to schedule appt with diabetic center for further management  -- call to schedule appt with physical

## 2019-03-25 ENCOUNTER — APPOINTMENT (OUTPATIENT)
Dept: PHYSICAL THERAPY | Age: 49
End: 2019-03-25
Attending: FAMILY MEDICINE
Payer: COMMERCIAL

## 2019-03-28 ENCOUNTER — APPOINTMENT (OUTPATIENT)
Dept: PHYSICAL THERAPY | Age: 49
End: 2019-03-28
Attending: FAMILY MEDICINE
Payer: COMMERCIAL

## 2019-04-04 ENCOUNTER — OFFICE VISIT (OUTPATIENT)
Dept: PHYSICAL THERAPY | Age: 49
End: 2019-04-04
Attending: FAMILY MEDICINE
Payer: COMMERCIAL

## 2019-04-04 DIAGNOSIS — G57.02 PIRIFORMIS SYNDROME, LEFT: ICD-10-CM

## 2019-04-04 PROCEDURE — 97110 THERAPEUTIC EXERCISES: CPT | Performed by: PHYSICAL THERAPIST

## 2019-04-04 PROCEDURE — 97161 PT EVAL LOW COMPLEX 20 MIN: CPT | Performed by: PHYSICAL THERAPIST

## 2019-04-04 NOTE — PROGRESS NOTES
SPINE EVALUATION:   Referring Physician: Dr. 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 Paola Silver would benefit from skilled Physical Therapy to address the above impairments for improved function with minimal discomfort.  Thank you for referring Paola Silver to [facility], [address] shows involvement of 1-2 body structures involved / activity limitation and the condition is stable and low complexity.      PLAN OF CARE:    Goals:    · Pt will improve transversus abdominis recruitment to perform proper isometric contraction without requi the need for these services furnished under this plan of treatment and while under my care.     X___________________________________________________ Date____________________    Certification From: 9/7/4724  To:7/3/2019

## 2019-04-08 ENCOUNTER — APPOINTMENT (OUTPATIENT)
Dept: PHYSICAL THERAPY | Age: 49
End: 2019-04-08
Attending: FAMILY MEDICINE
Payer: COMMERCIAL

## 2019-04-09 ENCOUNTER — TELEPHONE (OUTPATIENT)
Dept: FAMILY MEDICINE CLINIC | Facility: CLINIC | Age: 49
End: 2019-04-09

## 2019-04-09 DIAGNOSIS — Z12.31 ENCOUNTER FOR SCREENING MAMMOGRAM FOR BREAST CANCER: Primary | ICD-10-CM

## 2019-04-09 NOTE — TELEPHONE ENCOUNTER
Sofia Howard is calling to see if Dr Darren Elliott can put in a order for her mammogram, Please call Sofia Howard with any questions or concerns at [phone]

## 2019-04-11 ENCOUNTER — APPOINTMENT (OUTPATIENT)
Dept: PHYSICAL THERAPY | Age: 49
End: 2019-04-11
Attending: FAMILY MEDICINE
Payer: COMMERCIAL

## 2019-04-15 ENCOUNTER — APPOINTMENT (OUTPATIENT)
Dept: PHYSICAL THERAPY | Age: 49
End: 2019-04-15
Attending: FAMILY MEDICINE
Payer: COMMERCIAL

## 2019-04-18 ENCOUNTER — APPOINTMENT (OUTPATIENT)
Dept: PHYSICAL THERAPY | Age: 49
End: 2019-04-18
Attending: FAMILY MEDICINE
Payer: COMMERCIAL

## 2019-04-22 ENCOUNTER — APPOINTMENT (OUTPATIENT)
Dept: PHYSICAL THERAPY | Age: 49
End: 2019-04-22
Attending: FAMILY MEDICINE
Payer: COMMERCIAL

## 2019-04-24 ENCOUNTER — APPOINTMENT (OUTPATIENT)
Dept: PHYSICAL THERAPY | Age: 49
End: 2019-04-24
Attending: FAMILY MEDICINE
Payer: COMMERCIAL

## 2019-04-25 ENCOUNTER — APPOINTMENT (OUTPATIENT)
Dept: PHYSICAL THERAPY | Age: 49
End: 2019-04-25
Attending: FAMILY MEDICINE
Payer: COMMERCIAL

## 2019-04-29 ENCOUNTER — APPOINTMENT (OUTPATIENT)
Dept: PHYSICAL THERAPY | Age: 49
End: 2019-04-29
Attending: FAMILY MEDICINE
Payer: COMMERCIAL

## 2019-05-20 ENCOUNTER — PATIENT OUTREACH (OUTPATIENT)
Dept: FAMILY MEDICINE CLINIC | Facility: CLINIC | Age: 49
End: 2019-05-20

## 2019-07-24 ENCOUNTER — PATIENT OUTREACH (OUTPATIENT)
Dept: FAMILY MEDICINE CLINIC | Facility: CLINIC | Age: 49
End: 2019-07-24

## 2019-09-05 PROBLEM — E66.9 OBESITY: Status: ACTIVE | Noted: 2019-09-05

## 2019-09-05 PROBLEM — J30.2 SEASONAL ALLERGIC RHINITIS: Status: ACTIVE | Noted: 2019-09-05

## 2019-09-05 PROBLEM — E11.9 DIABETES (HCC): Status: ACTIVE | Noted: 2019-09-05

## 2019-09-05 PROBLEM — N83.202 CYST OF LEFT OVARY: Status: ACTIVE | Noted: 2017-05-18

## 2019-10-21 ENCOUNTER — OFFICE VISIT (OUTPATIENT)
Dept: FAMILY MEDICINE CLINIC | Facility: CLINIC | Age: 49
End: 2019-10-21
Payer: COMMERCIAL

## 2019-10-21 VITALS
WEIGHT: 231.38 LBS | SYSTOLIC BLOOD PRESSURE: 110 MMHG | BODY MASS INDEX: 39.5 KG/M2 | TEMPERATURE: 98 F | HEART RATE: 77 BPM | HEIGHT: 64.2 IN | OXYGEN SATURATION: 98 % | DIASTOLIC BLOOD PRESSURE: 60 MMHG

## 2019-10-21 DIAGNOSIS — E11.65 UNCONTROLLED TYPE 2 DIABETES MELLITUS WITH HYPERGLYCEMIA (HCC): ICD-10-CM

## 2019-10-21 DIAGNOSIS — G89.29 CHRONIC LEFT-SIDED LOW BACK PAIN WITH LEFT-SIDED SCIATICA: ICD-10-CM

## 2019-10-21 DIAGNOSIS — R35.0 URINARY FREQUENCY: ICD-10-CM

## 2019-10-21 DIAGNOSIS — M54.42 CHRONIC LEFT-SIDED LOW BACK PAIN WITH LEFT-SIDED SCIATICA: ICD-10-CM

## 2019-10-21 DIAGNOSIS — Z00.00 ROUTINE GENERAL MEDICAL EXAMINATION AT A HEALTH CARE FACILITY: Primary | ICD-10-CM

## 2019-10-21 DIAGNOSIS — E11.65 TYPE 2 DIABETES MELLITUS WITH HYPERGLYCEMIA, WITHOUT LONG-TERM CURRENT USE OF INSULIN (HCC): ICD-10-CM

## 2019-10-21 DIAGNOSIS — N89.8 VAGINAL DISCHARGE: ICD-10-CM

## 2019-10-21 DIAGNOSIS — Z23 IMMUNIZATION DUE: ICD-10-CM

## 2019-10-21 PROCEDURE — 90732 PPSV23 VACC 2 YRS+ SUBQ/IM: CPT | Performed by: FAMILY MEDICINE

## 2019-10-21 PROCEDURE — 87591 N.GONORRHOEAE DNA AMP PROB: CPT | Performed by: FAMILY MEDICINE

## 2019-10-21 PROCEDURE — 82570 ASSAY OF URINE CREATININE: CPT | Performed by: FAMILY MEDICINE

## 2019-10-21 PROCEDURE — 87086 URINE CULTURE/COLONY COUNT: CPT | Performed by: FAMILY MEDICINE

## 2019-10-21 PROCEDURE — 87491 CHLMYD TRACH DNA AMP PROBE: CPT | Performed by: FAMILY MEDICINE

## 2019-10-21 PROCEDURE — 90715 TDAP VACCINE 7 YRS/> IM: CPT | Performed by: FAMILY MEDICINE

## 2019-10-21 PROCEDURE — 82043 UR ALBUMIN QUANTITATIVE: CPT | Performed by: FAMILY MEDICINE

## 2019-10-21 PROCEDURE — 90471 IMMUNIZATION ADMIN: CPT | Performed by: FAMILY MEDICINE

## 2019-10-21 PROCEDURE — 99396 PREV VISIT EST AGE 40-64: CPT | Performed by: FAMILY MEDICINE

## 2019-10-21 PROCEDURE — 90472 IMMUNIZATION ADMIN EACH ADD: CPT | Performed by: FAMILY MEDICINE

## 2019-10-21 PROCEDURE — 99214 OFFICE O/P EST MOD 30 MIN: CPT | Performed by: FAMILY MEDICINE

## 2019-10-21 PROCEDURE — 83036 HEMOGLOBIN GLYCOSYLATED A1C: CPT | Performed by: FAMILY MEDICINE

## 2019-10-21 RX ORDER — DESLORATADINE 5 MG/1
5 TABLET ORAL DAILY
Qty: 90 TABLET | Refills: 0 | Status: SHIPPED | OUTPATIENT
Start: 2019-10-21 | End: 2020-06-09

## 2019-10-21 RX ORDER — IBUPROFEN 600 MG/1
600 TABLET ORAL EVERY 8 HOURS PRN
Qty: 50 TABLET | Refills: 1 | Status: SHIPPED | OUTPATIENT
Start: 2019-10-21

## 2019-10-21 RX ORDER — FLUTICASONE PROPIONATE 50 MCG
2 SPRAY, SUSPENSION (ML) NASAL DAILY
Qty: 1 BOTTLE | Refills: 2 | Status: SHIPPED | OUTPATIENT
Start: 2019-10-21 | End: 2019-12-13 | Stop reason: SINTOL

## 2019-10-21 RX ORDER — GLIMEPIRIDE 2 MG/1
2 TABLET ORAL
Qty: 90 TABLET | Refills: 1 | Status: SHIPPED | OUTPATIENT
Start: 2019-10-21 | End: 2020-04-24

## 2019-10-21 RX ORDER — FLUCONAZOLE 150 MG/1
150 TABLET ORAL ONCE
Qty: 2 TABLET | Refills: 0 | Status: SHIPPED | OUTPATIENT
Start: 2019-10-21 | End: 2019-10-21

## 2019-10-21 NOTE — PROGRESS NOTES
Rockne Najjar is a 52year old female who is here for Patient presents with:  Pelvic Pain: Pelvic pain with vaginal itching and vaginal discharge x 2 weeks ago  Sinus Problem: x 3 weeks  Low Back Pain: Left  lower back pain leading to left leg pain x 1 Sexual activity: Not Currently        Partners: Male        Birth control/protection: Hysterectomy    Other Topics      Concerns:        Caffeine Concern: No        Exercise: Yes          2 x weekly        Seat Belt: Yes      Wt Readings from Last 6 Encoun • Colon Cancer Paternal Grandmother    • Diabetes Paternal Grandmother    • Diabetes Paternal Grandfather    • Heart Disease Paternal Grandfather    • Diabetes Son    • Heart Disorder Son       No past medical history on file.    Past Surgical History: type 2 diabetes mellitus with hyperglycemia (HCC)  -check labs  -a1c not at goal  -refilled glimeperide  -c/w metformin  -start [place] [facility] [place]  risks and side effects of med discussed, patient expressed understanding  -check sugars  -f/u in 2-4 wks    - COMP META

## 2019-10-21 NOTE — PATIENT INSTRUCTIONS
-- continue metformin  --restart glimiperide  -- start [place] [facility] [place] as prescribed    -- check sugar fasting and 2 hours after meals on some days    -- continue PT for back  -- continue ibuprofen as needed    -- start diflucan as prescribed  -- we will call wi

## 2019-11-20 ENCOUNTER — PATIENT OUTREACH (OUTPATIENT)
Dept: FAMILY MEDICINE CLINIC | Facility: CLINIC | Age: 49
End: 2019-11-20

## 2019-12-09 ENCOUNTER — TELEPHONE (OUTPATIENT)
Dept: FAMILY MEDICINE CLINIC | Facility: CLINIC | Age: 49
End: 2019-12-09

## 2019-12-09 NOTE — TELEPHONE ENCOUNTER
Emily Cartagena is calling to let Dr Oswald Aviles know that she is in [facility] with chest pain.

## 2019-12-13 ENCOUNTER — OFFICE VISIT (OUTPATIENT)
Dept: FAMILY MEDICINE CLINIC | Facility: CLINIC | Age: 49
End: 2019-12-13
Payer: COMMERCIAL

## 2019-12-13 ENCOUNTER — TELEPHONE (OUTPATIENT)
Dept: FAMILY MEDICINE CLINIC | Facility: CLINIC | Age: 49
End: 2019-12-13

## 2019-12-13 VITALS
TEMPERATURE: 98 F | HEART RATE: 78 BPM | DIASTOLIC BLOOD PRESSURE: 78 MMHG | OXYGEN SATURATION: 97 % | SYSTOLIC BLOOD PRESSURE: 132 MMHG | HEIGHT: 64.2 IN | WEIGHT: 237 LBS | BODY MASS INDEX: 40.46 KG/M2

## 2019-12-13 DIAGNOSIS — E11.65 UNCONTROLLED TYPE 2 DIABETES MELLITUS WITH HYPERGLYCEMIA (HCC): Primary | ICD-10-CM

## 2019-12-13 DIAGNOSIS — F43.9 STRESS: ICD-10-CM

## 2019-12-13 DIAGNOSIS — R07.89 OTHER CHEST PAIN: ICD-10-CM

## 2019-12-13 DIAGNOSIS — F41.9 ANXIETY: ICD-10-CM

## 2019-12-13 PROCEDURE — 1111F DSCHRG MED/CURRENT MED MERGE: CPT | Performed by: FAMILY MEDICINE

## 2019-12-13 PROCEDURE — 99215 OFFICE O/P EST HI 40 MIN: CPT | Performed by: FAMILY MEDICINE

## 2019-12-13 RX ORDER — ALBUTEROL SULFATE 90 UG/1
2 AEROSOL, METERED RESPIRATORY (INHALATION) EVERY 6 HOURS PRN
Qty: 1 INHALER | Refills: 0 | Status: SHIPPED | OUTPATIENT
Start: 2019-12-13 | End: 2020-04-24

## 2019-12-13 NOTE — PATIENT INSTRUCTIONS
Continue all meds as prescribed    Call to schedule appt with diabetic clinic to better control your sugars     will call you with counseling options    Followup in 1-2 months, sooner if needed

## 2019-12-13 NOTE — PROGRESS NOTES
Celeste Dunn is a 52year old female here for Patient presents with:  Hospital F/U: [facility]-ER x 3 days discharge dg: Chest pain and shortness of breath      HPI:       1.  Uncontrolled type 2 diabetes mellitus with hyperglycemia (HCC)  -co 1   • Glucose Blood (ERIKA CONTOUR NEXT TEST) In Vitro Strip Check sugars once daily.   Dx: E11.6 100 strip 2   • SITagliptin Phosphate 100 MG Oral Tab 1/2 tab daily for 1 wk, then 1 tab daily (Patient not taking: Reported on 12/13/2019 ) 90 tablet 1   • [place] Spencer Pitts MD    I spent a total of 40 minutes, more than half of which was spent counseling/coordinating care regarding chest pain, stress, dm

## 2019-12-13 NOTE — TELEPHONE ENCOUNTER
Patient signed HIPAA medical records authorization form for the Facility identified below to disclose patient health information to Gil Stanley / Provider Name:  [facility] Phone: [phone] [facility] Fax

## 2020-01-11 ENCOUNTER — HOSPITAL ENCOUNTER (OUTPATIENT)
Dept: MAMMOGRAPHY | Age: 50
Discharge: HOME | End: 2020-01-11
Attending: OBSTETRICS & GYNECOLOGY
Payer: COMMERCIAL

## 2020-01-11 DIAGNOSIS — Z12.39 ENCOUNTER FOR OTHER SCREENING FOR MALIGNANT NEOPLASM OF BREAST: ICD-10-CM

## 2020-01-11 PROCEDURE — 77063 BREAST TOMOSYNTHESIS BI: CPT | Performed by: OBSTETRICS & GYNECOLOGY

## 2020-01-11 PROCEDURE — 77067 SCR MAMMO BI INCL CAD: CPT | Performed by: OBSTETRICS & GYNECOLOGY

## 2020-01-22 ENCOUNTER — TELEPHONE (OUTPATIENT)
Dept: FAMILY MEDICINE CLINIC | Facility: CLINIC | Age: 50
End: 2020-01-22

## 2020-03-01 ENCOUNTER — HOSPITAL ENCOUNTER (EMERGENCY)
Facility: HOSPITAL | Age: 50
Discharge: HOME | End: 2020-03-01
Attending: EMERGENCY MEDICINE
Payer: COMMERCIAL

## 2020-03-01 ENCOUNTER — APPOINTMENT (OUTPATIENT)
Dept: GENERAL RADIOLOGY | Facility: HOSPITAL | Age: 50
End: 2020-03-01
Attending: EMERGENCY MEDICINE
Payer: COMMERCIAL

## 2020-03-01 VITALS
HEART RATE: 118 BPM | HEIGHT: 64 IN | RESPIRATION RATE: 16 BRPM | WEIGHT: 250 LBS | OXYGEN SATURATION: 99 % | SYSTOLIC BLOOD PRESSURE: 133 MMHG | DIASTOLIC BLOOD PRESSURE: 83 MMHG | BODY MASS INDEX: 42.68 KG/M2 | TEMPERATURE: 99 F

## 2020-03-01 DIAGNOSIS — R00.2 PALPITATIONS: Primary | ICD-10-CM

## 2020-03-01 LAB
ALBUMIN SERPL-MCNC: 2.7 G/DL (ref 3.4–5)
ALBUMIN/GLOB SERPL: 0.8 {RATIO} (ref 1–2)
ALP LIVER SERPL-CCNC: 57 U/L (ref 39–100)
ALT SERPL-CCNC: 22 U/L (ref 13–56)
ANION GAP SERPL CALC-SCNC: 8 MMOL/L (ref 0–18)
AST SERPL-CCNC: 23 U/L (ref 15–37)
BASOPHILS # BLD AUTO: 0.03 X10(3) UL (ref 0–0.2)
BASOPHILS NFR BLD AUTO: 0.5 %
BILIRUB SERPL-MCNC: 0.4 MG/DL (ref 0.1–2)
BUN BLD-MCNC: 10 MG/DL (ref 7–18)
BUN/CREAT SERPL: 10.4 (ref 10–20)
CALCIUM BLD-MCNC: 8.3 MG/DL (ref 8.5–10.1)
CHLORIDE SERPL-SCNC: 102 MMOL/L (ref 98–112)
CO2 SERPL-SCNC: 25 MMOL/L (ref 21–32)
CREAT BLD-MCNC: 0.96 MG/DL (ref 0.55–1.02)
D-DIMER: 0.29 UG/ML FEU (ref ?–0.5)
DEPRECATED RDW RBC AUTO: 42 FL (ref 35.1–46.3)
EOSINOPHIL # BLD AUTO: 0.07 X10(3) UL (ref 0–0.7)
EOSINOPHIL NFR BLD AUTO: 1.1 %
ERYTHROCYTE [DISTWIDTH] IN BLOOD BY AUTOMATED COUNT: 13.6 % (ref 11–15)
GLOBULIN PLAS-MCNC: 3.5 G/DL (ref 2.8–4.4)
GLUCOSE BLD-MCNC: 334 MG/DL (ref 70–99)
HCT VFR BLD AUTO: 42.8 % (ref 35–48)
HGB BLD-MCNC: 13.7 G/DL (ref 12–16)
IMM GRANULOCYTES # BLD AUTO: 0.01 X10(3) UL (ref 0–1)
IMM GRANULOCYTES NFR BLD: 0.2 %
LYMPHOCYTES # BLD AUTO: 2.66 X10(3) UL (ref 1–4)
LYMPHOCYTES NFR BLD AUTO: 42.8 %
M PROTEIN MFR SERPL ELPH: 6.2 G/DL (ref 6.4–8.2)
MCH RBC QN AUTO: 27.3 PG (ref 26–34)
MCHC RBC AUTO-ENTMCNC: 32 G/DL (ref 31–37)
MCV RBC AUTO: 85.4 FL (ref 80–100)
MONOCYTES # BLD AUTO: 0.62 X10(3) UL (ref 0.1–1)
MONOCYTES NFR BLD AUTO: 10 %
NEUTROPHILS # BLD AUTO: 2.83 X10 (3) UL (ref 1.5–7.7)
NEUTROPHILS # BLD AUTO: 2.83 X10(3) UL (ref 1.5–7.7)
NEUTROPHILS NFR BLD AUTO: 45.4 %
OSMOLALITY SERPL CALC.SUM OF ELEC: 292 MOSM/KG (ref 275–295)
PLATELET # BLD AUTO: 227 10(3)UL (ref 150–450)
POTASSIUM SERPL-SCNC: 4.5 MMOL/L (ref 3.5–5.1)
RBC # BLD AUTO: 5.01 X10(6)UL (ref 3.8–5.3)
SODIUM SERPL-SCNC: 135 MMOL/L (ref 136–145)
TSI SER-ACNC: 1.43 MIU/ML (ref 0.36–3.74)
WBC # BLD AUTO: 6.2 X10(3) UL (ref 4–11)

## 2020-03-01 PROCEDURE — 93005 ELECTROCARDIOGRAM TRACING: CPT

## 2020-03-01 PROCEDURE — 99285 EMERGENCY DEPT VISIT HI MDM: CPT

## 2020-03-01 PROCEDURE — 85025 COMPLETE CBC W/AUTO DIFF WBC: CPT | Performed by: EMERGENCY MEDICINE

## 2020-03-01 PROCEDURE — 80053 COMPREHEN METABOLIC PANEL: CPT | Performed by: EMERGENCY MEDICINE

## 2020-03-01 PROCEDURE — 84443 ASSAY THYROID STIM HORMONE: CPT | Performed by: EMERGENCY MEDICINE

## 2020-03-01 PROCEDURE — 71045 X-RAY EXAM CHEST 1 VIEW: CPT | Performed by: EMERGENCY MEDICINE

## 2020-03-01 PROCEDURE — 96360 HYDRATION IV INFUSION INIT: CPT

## 2020-03-01 PROCEDURE — 85379 FIBRIN DEGRADATION QUANT: CPT | Performed by: EMERGENCY MEDICINE

## 2020-03-01 PROCEDURE — 93010 ELECTROCARDIOGRAM REPORT: CPT

## 2020-03-01 NOTE — ED INITIAL ASSESSMENT (HPI)
Pt to ER from  for \"abnormal ekg. \" Pt sts at [facility] this morning she became dizzy and felt her heart racing. Denies FREDI or CP. Denies feeling dizzy at this time.

## 2020-03-02 LAB
ATRIAL RATE: 166 BPM
P AXIS: 48 DEGREES
P-R INTERVAL: 144 MS
Q-T INTERVAL: 346 MS
QRS DURATION: 94 MS
QTC CALCULATION (BEZET): 432 MS
R AXIS: -2 DEGREES
T AXIS: 15 DEGREES
VENTRICULAR RATE: 94 BPM

## 2020-03-02 NOTE — ED PROVIDER NOTES
Patient Seen in: [facility] Emergency Department      History   Patient presents with:  Abnormal Result    Stated Complaint: abnormal ekg - sinus rhythm PACs    HPI    42-year-old woman history of diabetes on oral medications, here with complaint of 42.91 kg/m²         Physical Exam        Physical Exam  Vitals signs and nursing note reviewed. General: Well-appearing middle-aged woman sitting on the bed in no acute distress. Head: Normocephalic and atraumatic.    Mouth/Throat:  Mucous membranes are of near syncope while sitting at [employer]. She does have a history of diabetes. Her EKG is unremarkable troponin negative she never had any chest pain she has no exertional symptoms.   TSH was also sent from triage this was normal.  Patient's glucose is anyi Xenograft Text: The defect edges were debeveled with a #15 scalpel blade.  Given the location of the defect, shape of the defect and the proximity to free margins a xenograft was deemed most appropriate.  The graft was then trimmed to fit the size of the defect.  The graft was then placed in the primary defect and oriented appropriately.

## 2020-03-12 ENCOUNTER — PATIENT OUTREACH (OUTPATIENT)
Dept: FAMILY MEDICINE CLINIC | Facility: CLINIC | Age: 50
End: 2020-03-12

## 2020-03-12 NOTE — PROGRESS NOTES
Patient is due for 1-2 month follow up (Colonoscopy, Hgb A1c, Lipids and Foot exam needed) Patient needs to schedule appointment with DM clinic. Left message for patient to call office. Patient needs appointment.

## 2020-04-20 ENCOUNTER — PATIENT OUTREACH (OUTPATIENT)
Dept: FAMILY MEDICINE CLINIC | Facility: CLINIC | Age: 50
End: 2020-04-20

## 2020-04-20 NOTE — PROGRESS NOTES
Patient is due for Due for DM follow up (Colonoscopy, Hgb A1c, Lipids and Foot exam needed). Left message for patient to call office. Patient needs appointment. Letter sent.

## 2020-04-22 ENCOUNTER — MOBILE ENCOUNTER (OUTPATIENT)
Dept: FAMILY MEDICINE CLINIC | Facility: CLINIC | Age: 50
End: 2020-04-22

## 2020-04-23 ENCOUNTER — HOSPITAL ENCOUNTER (EMERGENCY)
Facility: HOSPITAL | Age: 50
Discharge: HOME | End: 2020-04-23
Attending: EMERGENCY MEDICINE
Payer: COMMERCIAL

## 2020-04-23 ENCOUNTER — APPOINTMENT (OUTPATIENT)
Dept: GENERAL RADIOLOGY | Facility: HOSPITAL | Age: 50
End: 2020-04-23
Attending: EMERGENCY MEDICINE
Payer: COMMERCIAL

## 2020-04-23 VITALS
HEART RATE: 81 BPM | HEIGHT: 65 IN | TEMPERATURE: 100 F | RESPIRATION RATE: 18 BRPM | DIASTOLIC BLOOD PRESSURE: 58 MMHG | WEIGHT: 258 LBS | SYSTOLIC BLOOD PRESSURE: 100 MMHG | OXYGEN SATURATION: 93 % | BODY MASS INDEX: 42.99 KG/M2

## 2020-04-23 DIAGNOSIS — U07.1 COVID-19: Primary | ICD-10-CM

## 2020-04-23 PROCEDURE — 99453 REM MNTR PHYSIOL PARAM SETUP: CPT

## 2020-04-23 PROCEDURE — 80053 COMPREHEN METABOLIC PANEL: CPT | Performed by: EMERGENCY MEDICINE

## 2020-04-23 PROCEDURE — 99285 EMERGENCY DEPT VISIT HI MDM: CPT

## 2020-04-23 PROCEDURE — 93005 ELECTROCARDIOGRAM TRACING: CPT

## 2020-04-23 PROCEDURE — 85379 FIBRIN DEGRADATION QUANT: CPT

## 2020-04-23 PROCEDURE — 85025 COMPLETE CBC W/AUTO DIFF WBC: CPT | Performed by: EMERGENCY MEDICINE

## 2020-04-23 PROCEDURE — 84484 ASSAY OF TROPONIN QUANT: CPT | Performed by: EMERGENCY MEDICINE

## 2020-04-23 PROCEDURE — 96361 HYDRATE IV INFUSION ADD-ON: CPT

## 2020-04-23 PROCEDURE — 71045 X-RAY EXAM CHEST 1 VIEW: CPT | Performed by: EMERGENCY MEDICINE

## 2020-04-23 PROCEDURE — 96374 THER/PROPH/DIAG INJ IV PUSH: CPT

## 2020-04-23 PROCEDURE — 93010 ELECTROCARDIOGRAM REPORT: CPT

## 2020-04-23 RX ORDER — AZITHROMYCIN 250 MG/1
TABLET, FILM COATED ORAL
Qty: 1 PACKAGE | Refills: 0 | Status: SHIPPED | OUTPATIENT
Start: 2020-04-23 | End: 2020-04-28

## 2020-04-23 RX ORDER — ACETAMINOPHEN 500 MG
TABLET ORAL
Status: DISCONTINUED
Start: 2020-04-23 | End: 2020-04-23

## 2020-04-23 RX ORDER — KETOROLAC TROMETHAMINE 30 MG/ML
15 INJECTION, SOLUTION INTRAMUSCULAR; INTRAVENOUS ONCE
Status: COMPLETED | OUTPATIENT
Start: 2020-04-23 | End: 2020-04-23

## 2020-04-23 RX ORDER — ACETAMINOPHEN 500 MG
1000 TABLET ORAL ONCE
Status: COMPLETED | OUTPATIENT
Start: 2020-04-23 | End: 2020-04-23

## 2020-04-23 NOTE — ED INITIAL ASSESSMENT (HPI)
Constant chest pain for the last two days radiating to left arm. Patient also presents with cough. Denies fever. Roommate works at [employer] and Oh.

## 2020-04-23 NOTE — ED PROVIDER NOTES
Patient Seen in: [facility] Emergency Department      History   Patient presents with:  Dyspnea FREDI SOB    Stated Complaint: sob    HPI    51-year-old female presents for evaluation of chest pain.   Patient describes constant aching anterior chest pa deformity noted. Full range of motion throughout.         ED Course     Labs Reviewed   COMP METABOLIC PANEL (14) - Abnormal; Notable for the following components:       Result Value    Glucose 354 (*)     Sodium 135 (*)     Calcium, Total 8.2 (*)     Albu her symptoms worsen, she will return immediately to the ER.   Agrees with this plan      Disposition and Plan     Clinical Impression:  LDNOL-74  (primary encounter diagnosis)    Disposition:  Discharge  4/23/2020  3:13 am    Follow-up:  Ronald Nicole MD

## 2020-04-23 NOTE — CM/SW NOTE
Patient instructions included:  1. Orientation to the device, purpose, and return demo given  2. Instructions reviewed to check with pulse ox device every 8 hours and record oxygen level  3.  Check both sitting and after exertion (fast walking, climbing sta

## 2020-04-24 ENCOUNTER — VIRTUAL PHONE E/M (OUTPATIENT)
Dept: FAMILY MEDICINE CLINIC | Facility: CLINIC | Age: 50
End: 2020-04-24
Payer: COMMERCIAL

## 2020-04-24 DIAGNOSIS — E11.65 UNCONTROLLED TYPE 2 DIABETES MELLITUS WITH HYPERGLYCEMIA (HCC): ICD-10-CM

## 2020-04-24 DIAGNOSIS — U07.1 COVID-19 VIRUS DETECTED: Primary | ICD-10-CM

## 2020-04-24 DIAGNOSIS — F43.9 STRESS: ICD-10-CM

## 2020-04-24 DIAGNOSIS — F41.9 ANXIETY: ICD-10-CM

## 2020-04-24 PROCEDURE — 99214 OFFICE O/P EST MOD 30 MIN: CPT | Performed by: FAMILY MEDICINE

## 2020-04-24 RX ORDER — BENZONATATE 200 MG/1
200 CAPSULE ORAL 3 TIMES DAILY PRN
Qty: 20 CAPSULE | Refills: 0 | Status: SHIPPED | OUTPATIENT
Start: 2020-04-24 | End: 2021-04-09 | Stop reason: ALTCHOICE

## 2020-04-24 RX ORDER — GUAIFENESIN AND CODEINE PHOSPHATE 100; 10 MG/5ML; MG/5ML
5 SOLUTION ORAL 3 TIMES DAILY PRN
Qty: 118 ML | Refills: 0 | Status: SHIPPED | OUTPATIENT
Start: 2020-04-24 | End: 2020-04-29

## 2020-04-24 RX ORDER — GLIMEPIRIDE 2 MG/1
2 TABLET ORAL
Qty: 90 TABLET | Refills: 1 | Status: SHIPPED | OUTPATIENT
Start: 2020-04-24 | End: 2020-05-14

## 2020-04-24 RX ORDER — ALBUTEROL SULFATE 90 UG/1
2 AEROSOL, METERED RESPIRATORY (INHALATION) EVERY 6 HOURS PRN
Qty: 1 INHALER | Refills: 0 | Status: SHIPPED | OUTPATIENT
Start: 2020-04-24 | End: 2020-06-09

## 2020-04-24 NOTE — PROGRESS NOTES
Virtual/Telephone Check-In    Martin Pearl is a 48year old female here today for a telemedicine/video visit. Patient understands and accepts financial responsibility for any deductible, co-insurance and/or co-pays associated with this service. mouth daily with breakfast.   • metFORMIN HCl 1000 MG Oral Tab 180 tablet 1     Sig: TAKE 1 TABLET BY MOUTH TWO TIMES A DAY   • SITagliptin Phosphate 100 MG Oral Tab 90 tablet 1     Si/2 tab daily for 1 wk, then 1 tab daily   • Albuterol Sulfate HFA 10 daily for 1 wk, then 1 tab daily 90 tablet 1   • Albuterol Sulfate  (90 Base) MCG/ACT Inhalation Aero Soln Inhale 2 puffs into the lungs every 6 (six) hours as needed for Wheezing (or cough).  1 Inhaler 0   • azithromycin (ZITHROMAX Z-CARLOS ALBERTO) 250 MG Ora

## 2020-04-27 ENCOUNTER — VIRTUAL PHONE E/M (OUTPATIENT)
Dept: FAMILY MEDICINE CLINIC | Facility: CLINIC | Age: 50
End: 2020-04-27
Payer: COMMERCIAL

## 2020-04-27 DIAGNOSIS — E11.65 UNCONTROLLED TYPE 2 DIABETES MELLITUS WITH HYPERGLYCEMIA (HCC): ICD-10-CM

## 2020-04-27 DIAGNOSIS — U07.1 COVID-19 VIRUS DETECTED: Primary | ICD-10-CM

## 2020-04-27 DIAGNOSIS — R21 RASH: ICD-10-CM

## 2020-04-27 NOTE — PROGRESS NOTES
Virtual/Telephone Check-In    Leslie Lambert is a 48year old female here today for a telemedicine/video visit. Patient understands and accepts financial responsibility for any deductible, co-insurance and/or co-pays associated with this service. Procedure Laterality Date   •       x 2   • HYSTERECTOMY  2017    [facility] [place]   • OOPHORECTOMY     • SALPINGECTOMY        Family History   Problem Relation Age of Onset   • Diabetes Maternal Grandmother    • Colon Cancer Paternal Allergies:    Fruit                   HIVES      ROS:     --See HPI for relevant ROS  --GEN: Denies  --HEENT: Denies  --RESP: Denies  --CV: Denies  --GI: Denies  --: Denies  --MSK: Denies  All other systems reviewed are negative      Analia Allen

## 2020-04-29 ENCOUNTER — VIRTUAL PHONE E/M (OUTPATIENT)
Dept: FAMILY MEDICINE CLINIC | Facility: CLINIC | Age: 50
End: 2020-04-29
Payer: COMMERCIAL

## 2020-04-29 DIAGNOSIS — U07.1 COVID-19 VIRUS DETECTED: Primary | ICD-10-CM

## 2020-04-29 DIAGNOSIS — R05.9 COUGH: ICD-10-CM

## 2020-04-29 DIAGNOSIS — R21 RASH: ICD-10-CM

## 2020-04-29 PROCEDURE — 99214 OFFICE O/P EST MOD 30 MIN: CPT | Performed by: FAMILY MEDICINE

## 2020-04-29 RX ORDER — GUAIFENESIN AND CODEINE PHOSPHATE 100; 10 MG/5ML; MG/5ML
SOLUTION ORAL 3 TIMES DAILY PRN
Qty: 118 ML | Refills: 0 | Status: SHIPPED | OUTPATIENT
Start: 2020-04-29 | End: 2020-05-13

## 2020-04-29 NOTE — PROGRESS NOTES
Virtual/Telephone Check-In    Cata Tinsley is a 48year old female here today for a telemedicine/video visit. Patient understands and accepts financial responsibility for any deductible, co-insurance and/or co-pays associated with this service. Diabetes Paternal Grandmother    • Diabetes Paternal Grandfather    • Heart Disease Paternal Grandfather    • Diabetes Son    • Heart Disorder Son       Social History: Social History    Tobacco Use      Smoking status: Never Smoker      Smokeless tobacco: This has been done in good letha to provide continuity of care in the best interest of the provider-patient relationship, due to the ongoing public health crisis/national emergency and because of restrictions of visitation.   There are limitations of this v

## 2020-04-30 ENCOUNTER — TELEPHONE (OUTPATIENT)
Dept: CASE MANAGEMENT | Age: 50
End: 2020-04-30

## 2020-04-30 ENCOUNTER — PATIENT OUTREACH (OUTPATIENT)
Dept: CASE MANAGEMENT | Age: 50
End: 2020-04-30

## 2020-04-30 ENCOUNTER — TELEPHONE (OUTPATIENT)
Dept: FAMILY MEDICINE CLINIC | Facility: CLINIC | Age: 50
End: 2020-04-30

## 2020-04-30 DIAGNOSIS — R11.2 NON-INTRACTABLE VOMITING WITH NAUSEA, UNSPECIFIED VOMITING TYPE: ICD-10-CM

## 2020-04-30 DIAGNOSIS — U07.1 COVID-19 VIRUS INFECTION: Primary | ICD-10-CM

## 2020-04-30 RX ORDER — AZITHROMYCIN 250 MG/1
TABLET, FILM COATED ORAL
Qty: 6 TABLET | Refills: 0 | Status: SHIPPED | OUTPATIENT
Start: 2020-04-30 | End: 2020-05-05

## 2020-04-30 RX ORDER — NICOTINE POLACRILEX 4 MG/1
20 GUM, CHEWING ORAL
Qty: 30 TABLET | Refills: 0 | Status: SHIPPED | OUTPATIENT
Start: 2020-04-30 | End: 2020-05-30

## 2020-04-30 RX ORDER — ONDANSETRON 4 MG/1
4 TABLET, ORALLY DISINTEGRATING ORAL EVERY 8 HOURS PRN
Qty: 15 TABLET | Refills: 0 | Status: SHIPPED | OUTPATIENT
Start: 2020-04-30

## 2020-04-30 RX ORDER — METHYLPREDNISOLONE 4 MG/1
TABLET ORAL
Qty: 1 KIT | Refills: 0 | Status: SHIPPED | OUTPATIENT
Start: 2020-04-30 | End: 2020-06-09

## 2020-04-30 NOTE — PROGRESS NOTES
Home Monitoring Condition Update    Covid19+ test date: 4/23/20      Consent Verification:  Assessment Completed With: Patient  HIPAA Verified? Yes      Patient Reported Information:       How are you feeling today?  \" In the daytime I am better, night • Reviewed appropriate contacts for questions/concerns regarding patient status       Nurse Interventions:   Patient verbalized understanding that NCM will be calling daily for several days to check on her symptoms and to give the PCP a condition update.

## 2020-04-30 NOTE — TELEPHONE ENCOUNTER
On call concern:   Dxed with COVID 19 on 4/23. L side of her chest still hurts when she coughs, no taste, numbness of 2 fingers. Productive cough is worse at night.   Sitting up in bed due to orthopnea, but ok if sitting up.  -intermittent headache   Pul .

## 2020-04-30 NOTE — TELEPHONE ENCOUNTER
Spoke to patient for day #1 home monitoring. Patient states she does not have a theromometer in the home. Please assist in obtaining for patient, thank you!

## 2020-04-30 NOTE — TELEPHONE ENCOUNTER
Spoke to patient for COVID-19 Day #1 Home Monitoring.  Patient states she still is having continuous \"coughing at night, sometimes during the day, hard to expel the mucous, and is afraid of her breathing, the left side of her chest hurts 7/10, and she has

## 2020-04-30 NOTE — TELEPHONE ENCOUNTER
Attempted to reach PCP and on-call physician regarding symptoms below, left message with page  as well.

## 2020-05-01 ENCOUNTER — VIRTUAL PHONE E/M (OUTPATIENT)
Dept: FAMILY MEDICINE CLINIC | Facility: CLINIC | Age: 50
End: 2020-05-01
Payer: COMMERCIAL

## 2020-05-01 ENCOUNTER — PATIENT OUTREACH (OUTPATIENT)
Dept: CASE MANAGEMENT | Age: 50
End: 2020-05-01

## 2020-05-01 DIAGNOSIS — U07.1 COVID-19 VIRUS INFECTION: Primary | ICD-10-CM

## 2020-05-01 DIAGNOSIS — R11.2 NON-INTRACTABLE VOMITING WITH NAUSEA, UNSPECIFIED VOMITING TYPE: ICD-10-CM

## 2020-05-01 DIAGNOSIS — R05.9 COUGH: ICD-10-CM

## 2020-05-01 PROCEDURE — 99214 OFFICE O/P EST MOD 30 MIN: CPT | Performed by: FAMILY MEDICINE

## 2020-05-01 NOTE — PROGRESS NOTES
Virtual/Telephone Check-In    Stacie Mahan is a 48year old female here today for a telemedicine/video visit. Patient understands and accepts financial responsibility for any deductible, co-insurance and/or co-pays associated with this service. affect      HISTORY:       Past Medical History:   Diagnosis Date   • Asthma    • Diabetes [facility])       Past Surgical History:   Procedure Laterality Date   •       x 2   • HYSTERECTOMY  2017    [facility]   • OOPHORECTOMY     • SA Albuterol Sulfate  (90 Base) MCG/ACT Inhalation Aero Soln Inhale 2 puffs into the lungs every 6 (six) hours as needed for Wheezing (or cough).  1 Inhaler 0   • ibuprofen 600 MG Oral Tab Take 1 tablet (600 mg total) by mouth every 8 (eight) hours as n

## 2020-05-01 NOTE — TELEPHONE ENCOUNTER
Called pt re: thermometer, pt does not have anyone to come pick it up. I advised pt I will mail her one today. Pt was very thankful.

## 2020-05-01 NOTE — PROGRESS NOTES
Home Monitoring Condition Update    Covid19+ test date:4/23/20      Consent Verification:  Assessment Completed With: Patient  HIPAA Verified? Yes      Patient Reported Information:       How are you feeling today?  \"Better this morning\"  Do you feel y will be contacting her tomorrow for Day #3 Home Monitoring.     The patient was directed to continue to isolate away from other household members when possible and stay completely isolated from the general public 3 days after symptoms resolve or 7 days tota

## 2020-05-02 ENCOUNTER — PATIENT OUTREACH (OUTPATIENT)
Dept: CASE MANAGEMENT | Age: 50
End: 2020-05-02

## 2020-05-02 NOTE — PROGRESS NOTES
Home Monitoring Condition Update    Covid19+ test date:   4/23/2020      Consent Verification:  Assessment Completed With: Patient  HIPAA Verified? Yes      Patient Reported Information:       How are you feeling today?    Feeling better yesterday  Do yo Nurse Interventions: The patient stated is feeling better. She started taking her medication and feels better. She is still just having soup and liquids.   She denies any loose stools at the present time but stated she just started taking the a

## 2020-05-02 NOTE — PROGRESS NOTES
o o you need assistance or are you concerned with obtaining any food, medications, etc?    Yes needs helping obtain supplies in the future as food and cleaning supplies. Has a friend to help but lives far away. She stated she will try to find someone.

## 2020-05-04 ENCOUNTER — PATIENT OUTREACH (OUTPATIENT)
Dept: CASE MANAGEMENT | Age: 50
End: 2020-05-04

## 2020-05-04 ENCOUNTER — VIRTUAL PHONE E/M (OUTPATIENT)
Dept: FAMILY MEDICINE CLINIC | Facility: CLINIC | Age: 50
End: 2020-05-04
Payer: COMMERCIAL

## 2020-05-04 DIAGNOSIS — U07.1 COVID-19 VIRUS INFECTION: Primary | ICD-10-CM

## 2020-05-04 DIAGNOSIS — R05.9 COUGH: ICD-10-CM

## 2020-05-04 DIAGNOSIS — R11.2 NON-INTRACTABLE VOMITING WITH NAUSEA, UNSPECIFIED VOMITING TYPE: ICD-10-CM

## 2020-05-04 PROCEDURE — 99214 OFFICE O/P EST MOD 30 MIN: CPT | Performed by: FAMILY MEDICINE

## 2020-05-04 NOTE — PROGRESS NOTES
Virtual/Telephone Check-In    Tammie Bajwa is a 48year old female here today for a telemedicine/video visit. Patient understands and accepts financial responsibility for any deductible, co-insurance and/or co-pays associated with this service. Colon Cancer Paternal Grandmother    • Diabetes Paternal Grandmother    • Diabetes Paternal Grandfather    • Heart Disease Paternal Grandfather    • Diabetes Son    • Heart Disorder Son       Social History: Social History    Tobacco Use      Smoking statu not taking: Reported on 12/13/2019 ) 90 tablet 0   • Glucose Blood (ERIKA CONTOUR NEXT TEST) In Vitro Strip Check sugars once daily.   Dx: E11.6 100 strip 2       Allergies:    Fruit                   HIVES      ROS:     --See HPI for relevant ROS  --GEN: D

## 2020-05-04 NOTE — PROGRESS NOTES
Spoke with patient who states she has someone on the other line from the same number calling her. Instructed the patient this nurse will let her speak with the other person. Call completed. Will follow up later to see if a home monitoring was completed.

## 2020-05-05 NOTE — PROGRESS NOTES
Home Monitoring Condition Update    Covid19+ test date: 4/23/2020      Consent Verification:  Assessment Completed With: Patient  HIPAA Verified? Yes      Patient Reported Information:       How are you feeling today?  \"feeling better\" still no appetit

## 2020-05-06 ENCOUNTER — PATIENT OUTREACH (OUTPATIENT)
Dept: CASE MANAGEMENT | Age: 50
End: 2020-05-06

## 2020-05-06 NOTE — PROGRESS NOTES
Home Monitoring Condition Update    Covid19+ test date: 4/23/2020      Consent Verification:  Assessment Completed With: Patient  HIPAA Verified?   Yes    COVID-19 HOME MONITORING 5/6/2020   Temperature 97.3   Reading From Mouth   SPO2 96   Pulse 67   Pul agreement. Patient advised to continue monitoring temperature and pulse at least twice a day and record, rest and stay hydrated, small frequent meals w/BRAT diet--to call back with worsening symptoms--patient verbalizes understanding and agreement.

## 2020-05-07 ENCOUNTER — PATIENT OUTREACH (OUTPATIENT)
Dept: CASE MANAGEMENT | Age: 50
End: 2020-05-07

## 2020-05-07 NOTE — PROGRESS NOTES
Home Monitoring Condition Update    Covid19+ test date: 4/23/2020      Consent Verification:  Assessment Completed With: Patient  HIPAA Verified?   Yes    COVID-19 HOME MONITORING 5/7/2020   Temperature 97.3   Reading From Mouth   SPO2 97   Pulse 60   Pul minutes

## 2020-05-08 ENCOUNTER — PATIENT OUTREACH (OUTPATIENT)
Dept: CASE MANAGEMENT | Age: 50
End: 2020-05-08

## 2020-05-08 NOTE — PROGRESS NOTES
Home Monitoring Condition Update    Covid19+ test date: 4/23/2020      Consent Verification:  Assessment Completed With: Patient  HIPAA Verified?   Yes    COVID-19 HOME MONITORING 5/8/2020   Temperature 98.9   Reading From Mouth   SPO2 94   Pulse 106   Pu total (whichever is longer). Advised patient If symptoms worsen/ medical emergency to call 911.       Time spent this encounter reviewing chart, speaking with patient, gathering resources  Total Time: 10  minutes

## 2020-05-11 ENCOUNTER — VIRTUAL PHONE E/M (OUTPATIENT)
Dept: FAMILY MEDICINE CLINIC | Facility: CLINIC | Age: 50
End: 2020-05-11
Payer: COMMERCIAL

## 2020-05-11 ENCOUNTER — PATIENT OUTREACH (OUTPATIENT)
Dept: CASE MANAGEMENT | Age: 50
End: 2020-05-11

## 2020-05-11 DIAGNOSIS — R05.9 COUGH: ICD-10-CM

## 2020-05-11 DIAGNOSIS — R06.02 SHORTNESS OF BREATH: ICD-10-CM

## 2020-05-11 DIAGNOSIS — U07.1 COVID-19 VIRUS INFECTION: Primary | ICD-10-CM

## 2020-05-11 DIAGNOSIS — R07.89 OTHER CHEST PAIN: ICD-10-CM

## 2020-05-11 PROCEDURE — 99214 OFFICE O/P EST MOD 30 MIN: CPT | Performed by: FAMILY MEDICINE

## 2020-05-11 NOTE — PROGRESS NOTES
Home Monitoring Condition Update    Covid19+ test date: 4/23/2020      Consent Verification:  Assessment Completed With: Patient  HIPAA Verified?   Yes    COVID-19 HOME MONITORING 5/11/2020   Temperature 98.1   Reading From Mouth   SPO2 96   Pulse 140   P laundry and some housework on Saturday and then on Sunday she noticed some nagging chest pain, headache, tingling her in left arm and increased shortness of breath.  She tells me that she didn't feel that she needed to go to the ER, but she has not slept in

## 2020-05-11 NOTE — PROGRESS NOTES
Left message for the pt that her pcp will be calling her for a phone visit to evaluate her symptoms and if her symptoms decline do not hesitate to go to the ER and we will call tomorrow to follow up.

## 2020-05-11 NOTE — PROGRESS NOTES
Virtual/Telephone Check-In    James Lara is a 48year old female here today for a telemedicine/video visit. Patient understands and accepts financial responsibility for any deductible, co-insurance and/or co-pays associated with this service. Procedure Laterality Date   •       x 2   • HYSTERECTOMY  2017    [facility] [place]   • OOPHORECTOMY     • SALPINGECTOMY        Family History   Problem Relation Age of Onset   • Diabetes Maternal Grandmother    • Colon Cancer Paternal Desloratadine 5 MG Oral Tab Take 1 tablet (5 mg total) by mouth daily. (Patient not taking: Reported on 12/13/2019 ) 90 tablet 0   • Glucose Blood (ERIKA CONTOUR NEXT TEST) In Vitro Strip Check sugars once daily.   Dx: E11.6 100 strip 2       Allergies:

## 2020-05-12 ENCOUNTER — VIRTUAL PHONE E/M (OUTPATIENT)
Dept: FAMILY MEDICINE CLINIC | Facility: CLINIC | Age: 50
End: 2020-05-12
Payer: COMMERCIAL

## 2020-05-12 ENCOUNTER — PATIENT OUTREACH (OUTPATIENT)
Dept: CASE MANAGEMENT | Age: 50
End: 2020-05-12

## 2020-05-12 DIAGNOSIS — R05.9 COUGH: ICD-10-CM

## 2020-05-12 DIAGNOSIS — E11.65 UNCONTROLLED TYPE 2 DIABETES MELLITUS WITH HYPERGLYCEMIA (HCC): ICD-10-CM

## 2020-05-12 DIAGNOSIS — U07.1 COVID-19 VIRUS INFECTION: Primary | ICD-10-CM

## 2020-05-12 PROCEDURE — 99214 OFFICE O/P EST MOD 30 MIN: CPT | Performed by: FAMILY MEDICINE

## 2020-05-12 NOTE — PROGRESS NOTES
Virtual/Telephone Check-In    Zaina Sanchez is a 48year old female here today for a telemedicine/video visit. Patient understands and accepts financial responsibility for any deductible, co-insurance and/or co-pays associated with this service. affect      HISTORY:       Past Medical History:   Diagnosis Date   • Asthma    • Diabetes [facility])       Past Surgical History:   Procedure Laterality Date   •       x 2   • HYSTERECTOMY  2017    [facility] in [place], [place]   • OOPHORECTOMY     • SA ibuprofen 600 MG Oral Tab Take 1 tablet (600 mg total) by mouth every 8 (eight) hours as needed for Pain. 50 tablet 1   • Desloratadine 5 MG Oral Tab Take 1 tablet (5 mg total) by mouth daily.  (Patient not taking: Reported on 12/13/2019 ) 90 tablet 0   • G

## 2020-05-13 ENCOUNTER — TELEPHONE (OUTPATIENT)
Dept: FAMILY MEDICINE CLINIC | Facility: CLINIC | Age: 50
End: 2020-05-13

## 2020-05-14 ENCOUNTER — PATIENT OUTREACH (OUTPATIENT)
Dept: CASE MANAGEMENT | Age: 50
End: 2020-05-14

## 2020-05-14 ENCOUNTER — TELEPHONE (OUTPATIENT)
Dept: FAMILY MEDICINE CLINIC | Facility: CLINIC | Age: 50
End: 2020-05-14

## 2020-05-14 DIAGNOSIS — E11.65 UNCONTROLLED TYPE 2 DIABETES MELLITUS WITH HYPERGLYCEMIA (HCC): Primary | ICD-10-CM

## 2020-05-14 RX ORDER — GLIMEPIRIDE 2 MG/1
2 TABLET ORAL
Qty: 90 TABLET | Refills: 1 | Status: SHIPPED | OUTPATIENT
Start: 2020-05-14 | End: 2020-10-20

## 2020-05-14 NOTE — TELEPHONE ENCOUNTER
Scripts previously sent to [place] for 6 mo supply in APRIL 2020.    Scripts re-routed to [place] as requested

## 2020-05-14 NOTE — TELEPHONE ENCOUNTER
Eddie Easley MD  [facility] Home Monitoring 31 minutes ago (3:58 PM)      Ok to remove patient. Simone Perez for late response.     Routing comment

## 2020-05-14 NOTE — TELEPHONE ENCOUNTER
Pt called and said she had an appt with Dr. Doyle Chand yesterday but her diabetic medication was not called in to her pharmacy. She said it has to go to [facility] now instead of her local pharmacy.   She said it has to be called in to [facility]

## 2020-05-20 ENCOUNTER — VIRTUAL PHONE E/M (OUTPATIENT)
Dept: FAMILY MEDICINE CLINIC | Facility: CLINIC | Age: 50
End: 2020-05-20
Payer: COMMERCIAL

## 2020-05-20 DIAGNOSIS — U07.1 COVID-19 VIRUS INFECTION: Primary | ICD-10-CM

## 2020-05-20 DIAGNOSIS — R05.9 COUGH: ICD-10-CM

## 2020-05-20 DIAGNOSIS — E11.65 UNCONTROLLED TYPE 2 DIABETES MELLITUS WITH HYPERGLYCEMIA (HCC): ICD-10-CM

## 2020-05-20 PROCEDURE — 99214 OFFICE O/P EST MOD 30 MIN: CPT | Performed by: FAMILY MEDICINE

## 2020-05-20 RX ORDER — LANCETS 28 GAUGE
1 EACH MISCELLANEOUS DAILY
Qty: 1 BOX | Refills: 1 | Status: SHIPPED | OUTPATIENT
Start: 2020-05-20 | End: 2021-05-20

## 2020-05-20 RX ORDER — BLOOD-GLUCOSE METER
1 KIT MISCELLANEOUS DAILY
Qty: 1 KIT | Refills: 0 | Status: SHIPPED | OUTPATIENT
Start: 2020-05-20 | End: 2021-05-20

## 2020-05-20 RX ORDER — BLOOD-GLUCOSE METER
KIT MISCELLANEOUS
Qty: 100 STRIP | Refills: 0 | Status: SHIPPED | OUTPATIENT
Start: 2020-05-20 | End: 2021-05-17

## 2020-05-20 NOTE — PROGRESS NOTES
Virtual/Telephone Check-In    Jazmine Mascorro is a 48year old female here today for a telemedicine/video visit. Patient understands and accepts financial responsibility for any deductible, co-insurance and/or co-pays associated with this service. of visit  Psych: normal affect      HISTORY:       Past Medical History:   Diagnosis Date   • Asthma    • Diabetes [facility])       Past Surgical History:   Procedure Laterality Date   •       x 2   • HYSTERECTOMY  2017    [facility] in [place], [place] Oral Tab 1/2 tab daily for 1 wk, then 1 tab daily 90 tablet 1   • Albuterol Sulfate  (90 Base) MCG/ACT Inhalation Aero Soln Inhale 2 puffs into the lungs every 6 (six) hours as needed for Wheezing (or cough).  1 Inhaler 0   • ibuprofen 600 MG Oral Ta

## 2020-06-09 ENCOUNTER — VIRTUAL PHONE E/M (OUTPATIENT)
Dept: FAMILY MEDICINE CLINIC | Facility: CLINIC | Age: 50
End: 2020-06-09
Payer: COMMERCIAL

## 2020-06-09 ENCOUNTER — TELEPHONE (OUTPATIENT)
Dept: FAMILY MEDICINE CLINIC | Facility: CLINIC | Age: 50
End: 2020-06-09

## 2020-06-09 DIAGNOSIS — J30.2 SEASONAL ALLERGIES: ICD-10-CM

## 2020-06-09 DIAGNOSIS — U07.1 COVID-19 VIRUS INFECTION: Primary | ICD-10-CM

## 2020-06-09 DIAGNOSIS — E11.65 UNCONTROLLED TYPE 2 DIABETES MELLITUS WITH HYPERGLYCEMIA (HCC): ICD-10-CM

## 2020-06-09 PROCEDURE — 99214 OFFICE O/P EST MOD 30 MIN: CPT | Performed by: FAMILY MEDICINE

## 2020-06-09 RX ORDER — FLUTICASONE PROPIONATE 50 MCG
2 SPRAY, SUSPENSION (ML) NASAL DAILY
Qty: 1 BOTTLE | Refills: 2 | Status: SHIPPED | OUTPATIENT
Start: 2020-06-09 | End: 2020-09-07

## 2020-06-09 RX ORDER — ALBUTEROL SULFATE 90 UG/1
2 AEROSOL, METERED RESPIRATORY (INHALATION) EVERY 6 HOURS PRN
Qty: 1 INHALER | Refills: 0 | Status: SHIPPED | OUTPATIENT
Start: 2020-06-09 | End: 2021-05-17

## 2020-06-09 RX ORDER — DESLORATADINE 5 MG/1
5 TABLET ORAL DAILY
Qty: 90 TABLET | Refills: 1 | Status: SHIPPED | OUTPATIENT
Start: 2020-06-09 | End: 2021-04-07

## 2020-06-09 NOTE — TELEPHONE ENCOUNTER
Patient reports wheezing x2 days and thinks it is due to weather changes. Reports using rescue inhaler 2 times daily since Sunday w/out relief. Patient requesting something stronger with a steroid. Denies SOB or difficulty breathing.    Patient agreed

## 2020-06-09 NOTE — TELEPHONE ENCOUNTER
Virtual appt 5/20/20  1. COVID-19 virus infection  2.  Cough  -resolved  -cleared to return to work - she has been asymptomatic for over 1 week, and is almost 4 wks out from diagnosis  -continue will all necessary covid precautions  -note written  -call prn

## 2020-06-09 NOTE — PROGRESS NOTES
Virtual/Telephone Check-In    Nikkie Alicea is a 48year old female here today for a telemedicine audio only visit.     Patient understands and accepts financial responsibility for any deductible, co-insurance and/or co-pays associated with this serv History:   Procedure Laterality Date   •       x 2   • HYSTERECTOMY  2017    [facility] in [place], [place]   • OOPHORECTOMY     • SALPINGECTOMY        Family History   Problem Relation Age of Onset   • Diabetes Maternal Grandmother    • Colon Cancer 100 MG Oral Tab 1/2 tab daily for 1 wk, then 1 tab daily 90 tablet 1   • ibuprofen 600 MG Oral Tab Take 1 tablet (600 mg total) by mouth every 8 (eight) hours as needed for Pain.  50 tablet 1   • Glucose Blood (ERIKA CONTOUR NEXT TEST) In Vitro Strip Check

## 2020-06-09 NOTE — TELEPHONE ENCOUNTER
Pt called and said she will either need a stronger pump or she needs some steroids. She said she is wheezing because of her asthma. Ivon Cavazos in [facility] on [address].

## 2020-10-19 DIAGNOSIS — E11.65 UNCONTROLLED TYPE 2 DIABETES MELLITUS WITH HYPERGLYCEMIA (HCC): ICD-10-CM

## 2020-10-19 NOTE — TELEPHONE ENCOUNTER
Pt requesting refill of METFORMIN HCL 1000 MG Oral Tab    Last Time Medication was Filled:  8/14/20    Last Office Visit with Provider: 6/9/20. Last A1c was 10/21/19    No future appointments.

## 2020-10-20 DIAGNOSIS — E11.65 UNCONTROLLED TYPE 2 DIABETES MELLITUS WITH HYPERGLYCEMIA (HCC): ICD-10-CM

## 2020-10-20 RX ORDER — GLIMEPIRIDE 2 MG/1
TABLET ORAL
Qty: 90 TABLET | Refills: 0 | Status: SHIPPED | OUTPATIENT
Start: 2020-10-20 | End: 2021-05-17

## 2020-10-20 NOTE — TELEPHONE ENCOUNTER
Pt requesting refill of GLIMEPIRIDE 2 MG Oral Tab    Last Time Medication was Filled:  8/14/20    Last Office Visit with Provider: 6/9/20  No future appointments.

## 2020-10-21 ENCOUNTER — TELEPHONE (OUTPATIENT)
Dept: FAMILY MEDICINE CLINIC | Facility: CLINIC | Age: 50
End: 2020-10-21

## 2020-10-21 NOTE — TELEPHONE ENCOUNTER
Pt called and needs to know when she was tested for COVID and when she was released to go back to work. She said it is ok to leave a message on her VM because she is driving.

## 2020-10-21 NOTE — TELEPHONE ENCOUNTER
Collected:  4/23/2020 12:35 AM   Status:  Final result              Rapid SARS-CoV-2 by PCR   Not Detected Detected           The patient may return to work/school on 5/21/20 with the following limitations - none. Relayed above to patient.

## 2020-12-15 ENCOUNTER — TELEPHONE (OUTPATIENT)
Dept: ENDOCRINOLOGY CLINIC | Facility: CLINIC | Age: 50
End: 2020-12-15

## 2020-12-15 NOTE — TELEPHONE ENCOUNTER
Called patient regarding setting up an appointment with a diabetes provide for diabetes management at [facility] [place] based on A1c from high A1c list.Patient states, \"My numbers are fine. I don't need this. \" Patient refused to set up appointment w

## 2020-12-23 ENCOUNTER — TELEPHONE (OUTPATIENT)
Dept: FAMILY MEDICINE CLINIC | Facility: CLINIC | Age: 50
End: 2020-12-23

## 2020-12-23 NOTE — TELEPHONE ENCOUNTER
Patient reports burning and pain with urination x3 days. Denies blood in urine, odor, fever, back pain. Patient advised to follow up at one of our walk in clinics for urinalysis and treatment. Patient agreed with plan.      Patient wanted to make an

## 2021-01-28 ENCOUNTER — PATIENT OUTREACH (OUTPATIENT)
Dept: FAMILY MEDICINE CLINIC | Facility: CLINIC | Age: 51
End: 2021-01-28

## 2021-03-05 DIAGNOSIS — E11.65 UNCONTROLLED TYPE 2 DIABETES MELLITUS WITH HYPERGLYCEMIA (HCC): ICD-10-CM

## 2021-03-05 NOTE — TELEPHONE ENCOUNTER
Requested Prescriptions     Refused Prescriptions Disp Refills   • metFORMIN HCl 1000 MG Oral Tab 180 tablet 0     Sig: TAKE 1 TABLET BY MOUTH TWICE DAILY     Refused By: Marcie Batista     Reason for Refusal: Appt required, please call patient     Last OV

## 2021-03-11 ENCOUNTER — PATIENT OUTREACH (OUTPATIENT)
Dept: FAMILY MEDICINE CLINIC | Facility: CLINIC | Age: 51
End: 2021-03-11

## 2021-03-11 NOTE — PROGRESS NOTES
Left message for patient to call office. Patient needs appointment.  Due for Wellness visit and DM follow up

## 2021-04-07 PROBLEM — E11.9 TYPE 2 DIABETES MELLITUS WITHOUT COMPLICATION, WITHOUT LONG-TERM CURRENT USE OF INSULIN (HCC): Status: ACTIVE | Noted: 2019-09-05

## 2021-05-17 PROBLEM — E11.9 TYPE 2 DIABETES MELLITUS WITHOUT COMPLICATION, WITHOUT LONG-TERM CURRENT USE OF INSULIN (HCC): Status: RESOLVED | Noted: 2019-09-05 | Resolved: 2021-05-17

## 2021-05-17 PROBLEM — E11.65 UNCONTROLLED TYPE 2 DIABETES MELLITUS WITH HYPERGLYCEMIA (HCC): Status: ACTIVE | Noted: 2021-05-17

## 2021-05-17 PROBLEM — E66.812 CLASS 2 SEVERE OBESITY DUE TO EXCESS CALORIES WITH SERIOUS COMORBIDITY AND BODY MASS INDEX (BMI) OF 39.0 TO 39.9 IN ADULT (HCC): Status: ACTIVE | Noted: 2019-09-05

## 2021-05-17 PROBLEM — E66.01 CLASS 2 SEVERE OBESITY DUE TO EXCESS CALORIES WITH SERIOUS COMORBIDITY AND BODY MASS INDEX (BMI) OF 39.0 TO 39.9 IN ADULT (HCC): Status: ACTIVE | Noted: 2019-09-05

## 2021-07-12 PROBLEM — E11.42 TYPE 2 DIABETES MELLITUS WITH POLYNEUROPATHY (HCC): Status: ACTIVE | Noted: 2019-09-05

## 2022-08-04 ENCOUNTER — TELEPHONE (OUTPATIENT)
Dept: INTERNAL MEDICINE CLINIC | Facility: CLINIC | Age: 52
End: 2022-08-04

## 2025-07-14 ENCOUNTER — APPOINTMENT (OUTPATIENT)
Dept: CT IMAGING | Age: 55
End: 2025-07-14
Attending: EMERGENCY MEDICINE
Payer: COMMERCIAL

## 2025-07-14 ENCOUNTER — HOSPITAL ENCOUNTER (EMERGENCY)
Age: 55
Discharge: HOME | End: 2025-07-14
Attending: EMERGENCY MEDICINE
Payer: COMMERCIAL

## 2025-07-14 VITALS
HEART RATE: 70 BPM | SYSTOLIC BLOOD PRESSURE: 122 MMHG | TEMPERATURE: 97 F | DIASTOLIC BLOOD PRESSURE: 80 MMHG | RESPIRATION RATE: 16 BRPM | OXYGEN SATURATION: 100 %

## 2025-07-14 DIAGNOSIS — R73.9 HYPERGLYCEMIA: ICD-10-CM

## 2025-07-14 DIAGNOSIS — R10.32 LLQ PAIN: Primary | ICD-10-CM

## 2025-07-14 LAB
ALBUMIN SERPL-MCNC: 4.9 G/DL (ref 3.2–4.8)
ALBUMIN/GLOB SERPL: 1.9 {RATIO} (ref 1–2)
ALP LIVER SERPL-CCNC: 82 U/L (ref 41–108)
ALT SERPL-CCNC: 12 U/L (ref 10–49)
ANION GAP SERPL CALC-SCNC: 6 MMOL/L (ref 0–18)
AST SERPL-CCNC: 11 U/L (ref ?–34)
BASOPHILS # BLD AUTO: 0.03 X10(3) UL (ref 0–0.2)
BASOPHILS NFR BLD AUTO: 0.6 %
BILIRUB SERPL-MCNC: 0.7 MG/DL (ref 0.3–1.2)
BILIRUB UR QL STRIP.AUTO: NEGATIVE
BUN BLD-MCNC: 15 MG/DL (ref 9–23)
CALCIUM BLD-MCNC: 9.7 MG/DL (ref 8.7–10.6)
CHLORIDE SERPL-SCNC: 101 MMOL/L (ref 98–112)
CLARITY UR REFRACT.AUTO: CLEAR
CO2 SERPL-SCNC: 28 MMOL/L (ref 21–32)
COLOR UR AUTO: YELLOW
CREAT BLD-MCNC: 1.02 MG/DL (ref 0.55–1.02)
EGFRCR SERPLBLD CKD-EPI 2021: 65 ML/MIN/1.73M2 (ref 60–?)
EOSINOPHIL # BLD AUTO: 0.11 X10(3) UL (ref 0–0.7)
EOSINOPHIL NFR BLD AUTO: 2.2 %
ERYTHROCYTE [DISTWIDTH] IN BLOOD BY AUTOMATED COUNT: 13.4 %
GLOBULIN PLAS-MCNC: 2.6 G/DL (ref 2–3.5)
GLUCOSE BLD-MCNC: 370 MG/DL (ref 70–99)
GLUCOSE UR STRIP.AUTO-MCNC: >=1000 MG/DL
HCT VFR BLD AUTO: 38.8 % (ref 35–48)
HGB BLD-MCNC: 12.6 G/DL (ref 12–16)
IMM GRANULOCYTES # BLD AUTO: 0.01 X10(3) UL (ref 0–1)
IMM GRANULOCYTES NFR BLD: 0.2 %
KETONES UR STRIP.AUTO-MCNC: NEGATIVE MG/DL
LEUKOCYTE ESTERASE UR QL STRIP.AUTO: NEGATIVE
LYMPHOCYTES # BLD AUTO: 1.44 X10(3) UL (ref 1–4)
LYMPHOCYTES NFR BLD AUTO: 29 %
MCH RBC QN AUTO: 28.1 PG (ref 26–34)
MCHC RBC AUTO-ENTMCNC: 32.5 G/DL (ref 31–37)
MCV RBC AUTO: 86.4 FL (ref 80–100)
MONOCYTES # BLD AUTO: 0.37 X10(3) UL (ref 0.1–1)
MONOCYTES NFR BLD AUTO: 7.5 %
NEUTROPHILS # BLD AUTO: 3 X10 (3) UL (ref 1.5–7.7)
NEUTROPHILS # BLD AUTO: 3 X10(3) UL (ref 1.5–7.7)
NEUTROPHILS NFR BLD AUTO: 60.5 %
NITRITE UR QL STRIP.AUTO: NEGATIVE
OSMOLALITY SERPL CALC.SUM OF ELEC: 296 MOSM/KG (ref 275–295)
PH UR STRIP.AUTO: 5.5 [PH] (ref 5–8)
PLATELET # BLD AUTO: 232 10(3)UL (ref 150–450)
POTASSIUM SERPL-SCNC: 4.5 MMOL/L (ref 3.5–5.1)
PROT SERPL-MCNC: 7.5 G/DL (ref 5.7–8.2)
PROT UR STRIP.AUTO-MCNC: NEGATIVE MG/DL
RBC # BLD AUTO: 4.49 X10(6)UL (ref 3.8–5.3)
RBC UR QL AUTO: NEGATIVE
SODIUM SERPL-SCNC: 135 MMOL/L (ref 136–145)
SP GR UR STRIP.AUTO: 1.01 (ref 1–1.03)
UROBILINOGEN UR STRIP.AUTO-MCNC: 0.2 MG/DL
WBC # BLD AUTO: 5 X10(3) UL (ref 4–11)

## 2025-07-14 PROCEDURE — 96374 THER/PROPH/DIAG INJ IV PUSH: CPT

## 2025-07-14 PROCEDURE — 87591 N.GONORRHOEAE DNA AMP PROB: CPT | Performed by: EMERGENCY MEDICINE

## 2025-07-14 PROCEDURE — 99284 EMERGENCY DEPT VISIT MOD MDM: CPT

## 2025-07-14 PROCEDURE — 85025 COMPLETE CBC W/AUTO DIFF WBC: CPT | Performed by: EMERGENCY MEDICINE

## 2025-07-14 PROCEDURE — 81003 URINALYSIS AUTO W/O SCOPE: CPT | Performed by: EMERGENCY MEDICINE

## 2025-07-14 PROCEDURE — 87491 CHLMYD TRACH DNA AMP PROBE: CPT | Performed by: EMERGENCY MEDICINE

## 2025-07-14 PROCEDURE — 80053 COMPREHEN METABOLIC PANEL: CPT | Performed by: EMERGENCY MEDICINE

## 2025-07-14 PROCEDURE — 74177 CT ABD & PELVIS W/CONTRAST: CPT | Performed by: EMERGENCY MEDICINE

## 2025-07-14 PROCEDURE — 81514 NFCT DS BV&VAGINITIS DNA ALG: CPT | Performed by: EMERGENCY MEDICINE

## 2025-07-14 PROCEDURE — 96361 HYDRATE IV INFUSION ADD-ON: CPT

## 2025-07-14 RX ORDER — DICYCLOMINE HCL 20 MG
20 TABLET ORAL 4 TIMES DAILY PRN
Qty: 30 TABLET | Refills: 0 | Status: SHIPPED | OUTPATIENT
Start: 2025-07-14 | End: 2025-08-13

## 2025-07-14 RX ORDER — KETOROLAC TROMETHAMINE 15 MG/ML
15 INJECTION, SOLUTION INTRAMUSCULAR; INTRAVENOUS ONCE
Status: COMPLETED | OUTPATIENT
Start: 2025-07-14 | End: 2025-07-14

## 2025-07-14 NOTE — ED INITIAL ASSESSMENT (HPI)
Pt to ed from home with c/o abd pain lower l side, pt sx present last few days.denies trauma or injury

## 2025-07-14 NOTE — ED PROVIDER NOTES
Patient Seen in: [facility] In [place]        History  Chief Complaint   Patient presents with    Abdomen/Flank Pain     Stated Complaint: left lower abd pain onset thursday,    Subjective:   HPI    Patient presents with left lower abdominal pain.  The patient states that the symptoms started Thursday.  It reminds her of when she had diverticulitis.  She states that she also was worried about a possible STD.  She has had some slight vaginal discharge and dysuria.  She denies any fevers or chills.  She states that her stools are formed but seem to be more frequent recently.      Objective:     Past Medical History:    Asthma (HCC)    Diabetes (HCC)              Past Surgical History:   Procedure Laterality Date          x 2    Hysterectomy  2017    [facility] in [place], [place]    Oophorectomy      Salpingectomy                  Social History     Socioeconomic History    Marital status:    Occupational History    Occupation:    Tobacco Use    Smoking status: Never    Smokeless tobacco: Never   Vaping Use    Vaping status: Never Used   Substance and Sexual Activity    Alcohol use: No    Drug use: No    Sexual activity: Not Currently     Partners: Male     Birth control/protection: Hysterectomy   Other Topics Concern    Caffeine Concern No    Exercise Yes     Comment: 2 x weekly    Seat Belt Yes     Social Drivers of Health     Food Insecurity: Food Insecurity Present (2025)    Received from [facility]    Hunger Vital Sign     Worried About Running Out of Food in the Last Year: Often true     Ran Out of Food in the Last Year: Often true   Transportation Needs: Unmet Transportation Needs (2025)    Received from [facility]    PRAPARE - Transportation     Lack of Transportation (Medical): Yes     Lack of Transportation (Non-Medical): Yes   Housing Stability: High Risk (2025)    Received from [facility]    Housing Stability Vital Sign     Unable to Pay for  Housing in the Last Year: Yes     Homeless in the Last Year: Yes                                Physical Exam    ED Triage Vitals   BP 07/14/25 0835 107/59   Pulse 07/14/25 0835 86   Resp 07/14/25 0835 22   Temp 07/14/25 0836 97 °F (36.1 °C)   Temp src --    SpO2 07/14/25 0835 99 %   O2 Device 07/14/25 1001 None (Room air)       Current Vitals:   Vital Signs  BP: 122/80  Pulse: 70  Resp: 16  Temp: 97 °F (36.1 °C)    Oxygen Therapy  SpO2: 100 %  O2 Device: None (Room air)            Physical Exam  General: Alert and oriented x3 in no acute distress.  Cardiovascular: Regular rate and rhythm, no murmurs.  Respiratory: Lungs clear to auscultation.  Abdomen: Soft, tender to palpation in the left lower quadrant, no rebound or guarding, normal active bowel sounds, no CVA tenderness.  : No significant vaginal discharge on exam and no significant erythema to the vaginal wall.  Extremities: No CCE.  Skin: Warm and dry.        ED Course  Labs Reviewed   COMP METABOLIC PANEL (14) - Abnormal; Notable for the following components:       Result Value    Glucose 370 (*)     Sodium 135 (*)     Calculated Osmolality 296 (*)     Albumin 4.9 (*)     All other components within normal limits   URINALYSIS WITH CULTURE REFLEX - Abnormal; Notable for the following components:    Glucose Urine >=1000 (*)     All other components within normal limits   CBC WITH DIFFERENTIAL WITH PLATELET   CHLAMYDIA/GONOCOCCUS, GARCIA   VAGINITIS VAGINOSIS PCR PANEL        CT ABDOMEN+PELVIS(CONTRAST ONLY)(CPT=74177)  Result Date: 7/14/2025  PROCEDURE: CT ABDOMEN+PELVIS(CONTRAST ONLY)(CPT=74177) INDICATIONS: left lower abd pain onset thursday, COMPARISON: CT of the abdomen and pelvis performed 12/14/2018. TECHNIQUE: CT imaging of the abdomen and pelvis was performed with intravenous contrast material. Automated exposure control techniques for dose reduction were used, adjustment of the mA and/or kV were based on patient's size. Use of iterative reconstruction  technique for dose reduction was used. Dose information is transmitted to the ACR (American College of Radiology) NRDR (National Radiology Data Registry) which includes the Dose Index Registry. CONTRAST: IOPAMIDOL 76% IV SOLN FOR POWER INJECTOR:100 mL FINDINGS: LIVER: No enlargement, atrophy, abnormal density, or significant focal lesion. BILIARY: No visible dilatation or calcification. PANCREAS: No lesion, fluid collection, ductal dilatation, or atrophy. SPLEEN: No enlargement or focal lesion. KIDNEYS: No mass, obstruction, or calcification. ADRENALS: No mass or enlargement. AORTA/VASCULAR: Atherosclerosis. No aneurysm or dissection. RETROPERITONEUM: No mass or enlarged adenopathy. BOWEL/MESENTERY: Normal caliber small and large bowel including the appendix. Colonic diverticular disease. No free fluid. ABDOMINAL WALL: Stable. Small right inguinal fat-containing hernia. URINARY BLADDER: No visible focal wall thickening, lesion, or calculus. PELVIC NODES: No adenopathy. PELVIC ORGANS: The uterus is not visualized most likely surgically removed, correlate clinically. BONES: Lower lumbar facet joint arthropathy. No fracture. LUNG BASES: No visible focal consolidation or pleural effusion. OTHER: Left breast surgical clips, correlate clinically.     CONCLUSION: Colonic diverticulosis without evidence for acute diverticulitis. No free fluid. Continued clinical correlation recommended. Electronically Verified and Signed by Attending Radiologist: Dilcia Kevin MD 7/14/2025 11:39 AM Workstation: [id number]    Medications   ketorolac (Toradol) 15 MG/ML injection 15 mg (15 mg Intravenous Given 7/14/25 0913)   sodium chloride 0.9 % IV bolus 1,000 mL (0 mL Intravenous Stopped 7/14/25 1204)   iopamidol 76% (ISOVUE-370) injection for power injector (100 mL Intravenous Given 7/14/25 1025)     The patient was given Toradol for pain and IV fluids for her hyperglycemia.                MDM     Patient presents with left lower quadrant  pain.  Differential diagnosis includes but is not limited to acute diverticulitis, diverticular abscess and urinary tract infection.  The patient does not have evidence of acute diverticulitis or other abnormality that would explain patient's pain on CT.  Her CBC is normal.  Her chemistry is significant only for hyperglycemia.  Her UA does not show evidence of infection.  The etiology of her pain is unclear at this time.  I will give her a trial of Bentyl to see if that will give her some relief.  I have referred her to gastroenterology.  I have counseled her to monitor her sugar closely, make sure to hydrate and watch her sugar intake.  If the patient develops new or worsening symptoms she should return for repeat evaluation.        Medical Decision Making      Disposition and Plan     Clinical Impression:  1. LLQ pain    2. Hyperglycemia         Disposition:  Discharge  7/14/2025 11:53 am    Follow-up:  Eleazar Meyers MD  [address] [address]  [facility] [address]  [phone]    Call in 1 day(s)      Mono Duarte MD  [id number] [facility] [address]  [facility] [address]  [phone]    Call in 1 day(s)            Medications Prescribed:  Discharge Medication List as of 7/14/2025 12:05 PM        START taking these medications    Details   dicyclomine 20 MG Oral Tab Take 1 tablet (20 mg total) by mouth 4 (four) times daily as needed., Normal, Disp-30 tablet, R-0                   Supplementary Documentation:

## 2025-07-15 LAB
BV BACTERIA DNA VAG QL NAA+PROBE: NEGATIVE
C GLABRATA DNA VAG QL NAA+PROBE: NEGATIVE
C KRUSEI DNA VAG QL NAA+PROBE: NEGATIVE
C TRACH DNA SPEC QL NAA+PROBE: NEGATIVE
CANDIDA DNA VAG QL NAA+PROBE: NEGATIVE
N GONORRHOEA DNA SPEC QL NAA+PROBE: NEGATIVE
T VAGINALIS DNA VAG QL NAA+PROBE: NEGATIVE

## 2025-07-21 ENCOUNTER — OFFICE VISIT (OUTPATIENT)
Dept: FAMILY MEDICINE CLINIC | Facility: CLINIC | Age: 55
End: 2025-07-21
Payer: COMMERCIAL

## 2025-07-21 VITALS
BODY MASS INDEX: 35.92 KG/M2 | RESPIRATION RATE: 16 BRPM | DIASTOLIC BLOOD PRESSURE: 60 MMHG | OXYGEN SATURATION: 97 % | HEIGHT: 64.5 IN | HEART RATE: 79 BPM | WEIGHT: 213 LBS | SYSTOLIC BLOOD PRESSURE: 110 MMHG

## 2025-07-21 DIAGNOSIS — Z76.89 ENCOUNTER TO ESTABLISH CARE: ICD-10-CM

## 2025-07-21 DIAGNOSIS — E11.42 TYPE 2 DIABETES MELLITUS WITH POLYNEUROPATHY (HCC): Primary | ICD-10-CM

## 2025-07-21 DIAGNOSIS — E78.49 OTHER HYPERLIPIDEMIA: ICD-10-CM

## 2025-07-21 DIAGNOSIS — Z23 NEED FOR VACCINATION: ICD-10-CM

## 2025-07-21 DIAGNOSIS — C50.212 MALIGNANT NEOPLASM OF UPPER-INNER QUADRANT OF LEFT BREAST IN FEMALE, ESTROGEN RECEPTOR POSITIVE (HCC): ICD-10-CM

## 2025-07-21 DIAGNOSIS — R00.2 PALPITATIONS: ICD-10-CM

## 2025-07-21 DIAGNOSIS — R10.32 LLQ ABDOMINAL PAIN: ICD-10-CM

## 2025-07-21 DIAGNOSIS — Z17.0 MALIGNANT NEOPLASM OF UPPER-INNER QUADRANT OF LEFT BREAST IN FEMALE, ESTROGEN RECEPTOR POSITIVE (HCC): ICD-10-CM

## 2025-07-21 DIAGNOSIS — I10 ESSENTIAL HYPERTENSION: ICD-10-CM

## 2025-07-21 LAB
CREAT UR-SCNC: 38.2 MG/DL
MICROALBUMIN UR-MCNC: <0.3 MG/DL

## 2025-07-21 PROCEDURE — 82043 UR ALBUMIN QUANTITATIVE: CPT | Performed by: STUDENT IN AN ORGANIZED HEALTH CARE EDUCATION/TRAINING PROGRAM

## 2025-07-21 PROCEDURE — 3008F BODY MASS INDEX DOCD: CPT | Performed by: STUDENT IN AN ORGANIZED HEALTH CARE EDUCATION/TRAINING PROGRAM

## 2025-07-21 PROCEDURE — 3078F DIAST BP <80 MM HG: CPT | Performed by: STUDENT IN AN ORGANIZED HEALTH CARE EDUCATION/TRAINING PROGRAM

## 2025-07-21 PROCEDURE — 90471 IMMUNIZATION ADMIN: CPT | Performed by: STUDENT IN AN ORGANIZED HEALTH CARE EDUCATION/TRAINING PROGRAM

## 2025-07-21 PROCEDURE — 90677 PCV20 VACCINE IM: CPT | Performed by: STUDENT IN AN ORGANIZED HEALTH CARE EDUCATION/TRAINING PROGRAM

## 2025-07-21 PROCEDURE — 82570 ASSAY OF URINE CREATININE: CPT | Performed by: STUDENT IN AN ORGANIZED HEALTH CARE EDUCATION/TRAINING PROGRAM

## 2025-07-21 PROCEDURE — 3074F SYST BP LT 130 MM HG: CPT | Performed by: STUDENT IN AN ORGANIZED HEALTH CARE EDUCATION/TRAINING PROGRAM

## 2025-07-21 PROCEDURE — 99204 OFFICE O/P NEW MOD 45 MIN: CPT | Performed by: STUDENT IN AN ORGANIZED HEALTH CARE EDUCATION/TRAINING PROGRAM

## 2025-07-21 RX ORDER — VALSARTAN 40 MG/1
1 TABLET ORAL DAILY
COMMUNITY
Start: 2024-06-13

## 2025-07-21 RX ORDER — PREGABALIN 75 MG/1
75 CAPSULE ORAL 2 TIMES DAILY
COMMUNITY
Start: 2024-10-27

## 2025-07-21 RX ORDER — LETROZOLE 2.5 MG/1
2.5 TABLET, FILM COATED ORAL DAILY
COMMUNITY
Start: 2023-12-21

## 2025-07-21 RX ORDER — ORAL SEMAGLUTIDE 3 MG/1
1 TABLET ORAL DAILY
COMMUNITY
Start: 2025-06-22

## 2025-07-21 RX ORDER — ROSUVASTATIN CALCIUM 10 MG/1
10 TABLET, COATED ORAL NIGHTLY
COMMUNITY
Start: 2024-01-02

## 2025-07-21 RX ORDER — ACYCLOVIR 400 MG/1
1 TABLET ORAL
COMMUNITY

## 2025-07-21 RX ORDER — DULOXETIN HYDROCHLORIDE 60 MG/1
CAPSULE, DELAYED RELEASE ORAL
COMMUNITY
Start: 2023-09-19

## 2025-07-21 RX ORDER — DILTIAZEM HYDROCHLORIDE 300 MG/1
300 CAPSULE, COATED, EXTENDED RELEASE ORAL NIGHTLY
COMMUNITY
Start: 2025-03-31 | End: 2025-09-27

## 2025-07-21 NOTE — PROGRESS NOTES
[facility] Family Medicine Office Note    HPI:     Ginette Madrigal is a 55 year old female presenting to [facility] care and for ER f/u.     Abdominal pain, hx of diverticulitis   Presented to ER on 7/14/25 for abdominal pain. Per ER note:     \"...patient states that the symptoms started Thursday. It reminds her of when she had diverticulitis. She states that she also was worried about a possible STD. She has had some slight vaginal discharge and dysuria. She denies any fevers or chills. She states that her stools are formed but seem to be more frequent recently...The patient does not have evidence of acute diverticulitis or other abnormality that would explain patient's pain on CT. Her CBC is normal. Her chemistry is significant only for hyperglycemia. Her UA does not show evidence of infection. \"    Vaginitis panel and gonorrhea/chlamydia testing negative.     Discharged with dicyclomine. Still having some abdominal pain in left lower quadrant. Has f/u with GI tomorrow.     Breast cancer, left  Per chart review from 5/29/25 note from heme/onc team: \"...luminal B stage IA breast CA s/p left lumpectomy 3/1/23 s/p weekly taxol x 12 Oncotype DX score of 29, s/p adjuvant radiation. She was advised adjuvant letrozole 2.5mg/d for a 5 yr course.\"    Pathologic stage completed 3/1/23.     Getting treated at [facility] ([place])     Per chart review, DEXA scan every 2 years with next repeat 5/2026. Patient also to continue calcium 1200 mg as well as vitamin D 800 units daily.     Drug-induced peripheral neuropathy  See above.   On gabapentin previously but may now be on pregabalin (patient to clarify).  Also may be on duloxetine (again patient to clarify her medication names and dosages when she goes home)  Sees therapist.     Type 2 DM  Sees diabetes specialist.   Has Dexcom.   Was previously Trulicity 4.5 mg weekly (but apparently had limited access due to availability)  Was also previously on glimepiride.   On  rybelsus 3 mg daily   On metformin 1000 mg BID  Last A1c on 3/31/25 at 14.1 but per patient had improved to around 10 on more recent check through her diabetes specialist.     HTN  On valsartan 40 mg daily    HLD  On rosuvastatin 10 mg daily    Palpitations  States with previous provide she was worked up for persistent palpitations.   Saw cardiologist and although testing unremarkable eventually ended up starting diltiazem with symptomatic improvement.   On diltiazem  mg daily      HISTORY:  Past Medical History[1]   Past Surgical History[2]   Family History[3]   Social History: Short Social Hx on File[4]     Medications (Active prior to today's visit):  Current Medications[5]    Allergies:  Allergies[6]      ROS:   Review of Systems   Constitutional:  Negative for chills and fever.   Gastrointestinal:         +LLQ soreness       Otherwise see HPI    PHYSICAL EXAM:   /60   Pulse 79   Resp 16   Ht 5' 4.5\" (1.638 m)   Wt 213 lb (96.6 kg)   LMP  (LMP Unknown)   SpO2 97%   BMI 36.00 kg/m²  Estimated body mass index is 36 kg/m² as calculated from the following:    Height as of this encounter: 5' 4.5\" (1.638 m).    Weight as of this encounter: 213 lb (96.6 kg).   Vital signs reviewed.Appears stated age, well groomed.    Physical Exam  Constitutional:       General: She is not in acute distress.  Abdominal:      General: Bowel sounds are normal.      Palpations: Abdomen is soft.      Comments: +mild tenderness in LLQ, no rebound or guarding    Neurological:      Mental Status: She is alert.     Diabetic Foot Exam:  Bilateral barefoot skin diabetic exam is normal, visualized feet and the appearance is normal.  Bilateral monofilament/sensation of both feet is normal.  Pulsation pedal pulse exam of both lower legs/feet is normal as well.  Proprioception intact bilaterally  Achilles reflex intact bilaterally.     ASSESSMENT/PLAN:     55 year old female presenting to [facility] care and for ER f/u.       1.  Encounter to establish care    2. Type 2 diabetes mellitus with polyneuropathy (HCC)  - continue f/u with diabetes specialist   - Ophthalmology Referral - In Network  - Microalb/Creat Ratio, Random Urine    3. Need for vaccination  - Prevnar 20 (PCV20) [[id number]]    4. LLQ abdominal pain  - has f/u with GI     5. Palpitations  - needs new cardiologist   - [facility] CARDIOLOGY EXTERNAL    6. Essential hypertension  BP shows good control with last BP of 110/60. Continue lifestyle changes, diet, exercise and weight loss.   2025: Potassium 4.5; Creatinine 1.02; eGFR-Cr 65  BP Meds: dilTIAZem ER Cp24 - 300 MG; valsartan Tabs - 40 MG   ACE/ARB Adherence Poor at 70%     7. Other hyperlipidemia  - continue rosuvastatin 10 mg daily     8. Malignant neoplasm of upper-inner quadrant of left breast in female, estrogen receptor positive (HCC)  - treatment plan per heme-onc    Follow-up: in 3-6 months for annual exam    Outcome: Patient verbalizes understanding. Patient is notified to call with any questions, complications, allergies, or worsening or changing symptoms.  Patient is to call with any side effects or complications from the treatments as a result of today.     Total length of visit/charting: approximately 46 min    Eleazar Meyers MD, 25, 2:26 PM      Please note that portions of this note may have been completed with a voice recognition program. Efforts were made to edit the dictations but occasionally words are mis-transcribed.         [1]   Past Medical History:   Asthma (HCC)    Diabetes (HCC)   [2]   Past Surgical History:  Procedure Laterality Date          x 2    Hysterectomy  2017    [facility] in [place], [place]    Oophorectomy      Salpingectomy     [3]   Family History  Problem Relation Age of Onset    Diabetes Maternal Grandmother     Colon Cancer Paternal Grandmother     Diabetes Paternal Grandmother     Diabetes Paternal Grandfather     Heart Disease Paternal Grandfather      Diabetes Son     Heart Disorder Son     Breast Cancer Maternal Aunt         dx age 50s   [4]   Social History  Socioeconomic History    Marital status:    Occupational History    Occupation:    Tobacco Use    Smoking status: Never    Smokeless tobacco: Never   Vaping Use    Vaping status: Never Used   Substance and Sexual Activity    Alcohol use: No    Drug use: No    Sexual activity: Not Currently     Partners: Male     Birth control/protection: Hysterectomy   Other Topics Concern    Caffeine Concern No    Exercise Yes     Comment: 2 x weekly    Seat Belt Yes     Social Drivers of Health     Food Insecurity: Food Insecurity Present (5/29/2025)    Received from [facility]    Hunger Vital Sign     Worried About Running Out of Food in the Last Year: Often true     Ran Out of Food in the Last Year: Often true   Transportation Needs: Unmet Transportation Needs (5/29/2025)    Received from [facility]    PRAPARE - Transportation     Lack of Transportation (Medical): Yes     Lack of Transportation (Non-Medical): Yes   Housing Stability: High Risk (5/29/2025)    Received from [facility]    Housing Stability Vital Sign     Unable to Pay for Housing in the Last Year: Yes     Homeless in the Last Year: Yes   [5]   Current Outpatient Medications   Medication Sig Dispense Refill    RYBELSUS 3 MG Oral Tab Take 1 tablet by mouth daily.      valsartan 40 MG Oral Tab Take 1 tablet (40 mg total) by mouth daily.      rosuvastatin 10 MG Oral Tab Take 1 tablet (10 mg total) by mouth nightly.      pregabalin 75 MG Oral Cap Take 1 capsule (75 mg total) by mouth 2 (two) times daily.      DULoxetine 60 MG Oral Cap DR Particles TAKE 1 CAPSULE BY MOUTH EVERY DAY AT NIGHT FOR ANXIETY OR NERVE PAIN      dilTIAZem  MG Oral Capsule SR 24 Hr Take 1 capsule (300 mg total) by mouth nightly.      Continuous Glucose Sensor (DEXCOM G7 SENSOR) Does not apply Misc 1 each Every 10 days. Use as directed every 10 days       dicyclomine 20 MG Oral Tab Take 1 tablet (20 mg total) by mouth 4 (four) times daily as needed. 30 tablet 0    metFORMIN HCl 1000 MG Oral Tab Take 1 tablet (1,000 mg total) by mouth 2 (two) times daily with meals. 180 tablet 0    atorvastatin 40 MG Oral Tab Take 1 tablet (40 mg total) by mouth daily. 30 tablet 0    Meloxicam 15 MG Oral Tab Take 1 tablet (15 mg total) by mouth daily. 90 tablet 1    gabapentin 300 MG Oral Cap Take 1 capsule (300 mg total) by mouth nightly. 90 capsule 1    Albuterol Sulfate  (90 Base) MCG/ACT Inhalation Aero Soln Inhale 2 puffs into the lungs every 6 (six) hours as needed for Wheezing or Shortness of Breath (or cough). 2 Inhaler 0    Desloratadine 5 MG Oral Tab Take 1 tablet (5 mg total) by mouth daily. 90 tablet 1    ibuprofen 600 MG Oral Tab Take 1 tablet (600 mg total) by mouth every 8 (eight) hours as needed for Pain. 50 tablet 1    letrozole 2.5 MG Oral Tab Take 1 tablet (2.5 mg total) by mouth daily. (Patient not taking: Reported on 7/21/2025)      mupirocin 2 % External Ointment Apply to affected area bid x 7-10 days (Patient not taking: Reported on 7/21/2025) 30 g 1   [6]   Allergies  Allergen Reactions    Fruit HIVES     Fresh fruit

## 2025-07-21 NOTE — PATIENT INSTRUCTIONS
Video [employer]™  What is Type 2 Diabetes?  Watch this clip to understand what happens within your body when you have type 2 diabetes, and the importance of keeping your blood glucose levels within a healthy range.  To watch the video:  Scan the QR code  Using your mobile device, scan the following code:  OR  Go to the website:  [employer]  Enter the prescription code:  1576E    © 0404-4010 [employer]. All rights reserved. This information is not intended as a substitute for professional medical care. Always follow your healthcare professional's instructions.    Managing Type 2 Diabetes  Type 2 diabetes is a long-term (chronic) condition. Managing diabetes may mean making some tough changes. Yourhealthcare team can help you.  To manage type 2 diabetes, you'll need to balance your medicine with diet and activity. You will also need check your blood sugar. And work with yourhealthcare provider to prevent complications.    Take your medicine  You may take pills or give yourself insulin shots for diabetes. Or you may use both. Take your medicines or give yourself insulin at the right times to help you control your blood sugar. Think about ways that will help you remember to take your medicines the right way every day. Ask your healthcare provider orteam for ideas.  You may only take pills for your diabetes. But this may change. Over time, mostpeople with type 2 diabetes also need insulin.  Eat healthy  A healthy diet helps control the amount of sugar in your blood. It also helps you stay at a healthy weight. Or it helps you lose weight, if if you'reoverweight. Extra weight makes it harder to control diabetes.  Your healthcare team will help you create a plan that works for you. You don'thave to give up all the foods you like. Have meals and snacks with:  Vegetables  Fruits  Lean meats or other healthy proteins  Whole grains  Low- or nonfat dairy products     Be physically  active  Being active helps lower your blood sugar. Activity helps your body use insulinto turn food into energy. It also helps you manage your weight:  Ask your healthcare provider to help you to make an activity program that's right for you. Your program is based on your age, general health, and types of activity you enjoy. Start slow. But aim for at least 150 minutes of exercise or activity each week. Start with 30 minutes a day. Exercise in 10-minute blocks. Don’t let more than 2 days go by without being active.  Check your blood sugar  Checking your own blood sugar may be a regular part of your care. Or you may only need to check your blood sugar from time to time. Your healthcare provider will tell you how to check your blood sugar at home. Checking it tells you if your blood sugar is in your target range. Having your blood sugars within thetarget range means that you are managing your diabetes well.  If your blood sugar levels are too high or too low, your healthcare provider may suggest changes to your diet or activity level. He or she may also adjustyour medicine.  Your healthcare provider may also tell you to check your blood sugar more oftenwhen you are sick.  Take care of yourself  When you have diabetes, you may be more likely to get other health problems. They include foot, eye, heart, nerve, and kidney problems. You can help prevent these problems by controlling your blood sugar and taking good care of yourself. Your healthcare provider, nurse, diabetes educator, and others canhelp you with the following:  Checkups. You should have regular checkups with your healthcare provider. At those visits, you will have a physical exam that includes checking your feet. Your healthcare provider will also check your blood pressure and weight. Take your shoes off before your appointment starts to be sure your feet are checked.  Other exams. You'll also need eye, foot, and dental exams at least once each year.  Lab  tests. You will have blood and urine tests:  Your healthcare provider will check your hemoglobin A1C at least twice a year. This blood test shows how well you have been controlling your blood sugar over 2 to 3 months. The results help your healthcare provider manage your diabetes.    You will also have other lab tests. For example, to check for kidney problems and abnormal cholesterol levels.  Smoking. If you smoke, you will need to quit. Smoking makes it more likely to get complications from diabetes. Ask your healthcare provider about ways to quit. Also don't use e-cigarette, or vaping, products.  Vaccines. Get a yearly flu shot. And ask your healthcare provider about vaccines to prevent pneumonia, shingles, and hepatitis B.  Stress and depression  Most people have challenges throughout their lives. Living with diabetes can increase your stress. Feeling stressed or depressed can actually affect yourblood sugar levels.  Tell your healthcare provider if you are having trouble coping with diabetes.He or she can help or refer you to other providers or programs.  To learn more  Know where you can get help. You can try the following:  Support. Ask family and friends to support your efforts to take care of yourself. Or look for a diabetes support group nearby or on the internet. Check the Connect with Others link on www.diabetes.org.  Counseling. Talk with a , psychologist, psychiatrist, or other counselor.  Information. Contact the American Diabetes Association at 924-819-5304 or www.diabetes.org  Nava last reviewed this educational content on11/1/2019    © 5157-3335 [employer]. All rights reserved. This information is not intended as a substitute for professional medical care. Always follow yourhealthcare professional's instructions.    Type 2 Diabetes and Food Choices  You make food choices every day. Whole wheat or white bread? A side of French fries or fresh fruit? Eat now or later?  Choices about what, when, and how much you eat affect your blood sugar (glucose), and also your blood pressure and cholesterol. Understanding how food affects blood glucose is the first step in managing diabetes. And following a diabetesmeal plan can help you keep your blood glucose levels on track.   Prevent problems  Having type 2 diabetes means that your body doesn’t control blood glucose well. When blood glucose stays too high for too long, serious health problems can develop. It's important to control your blood glucose through diet, exercise, and medicine. This can delay or prevent kidney,eye, nerve, and heart disease, and other complications of diabetes.   Control carbohydrates  Carbohydrates are foods that have the biggest effect on your blood glucose levels. After you eat carbohydrates, your blood glucose rises. Fruit, sweet foods and drinks, starchy foods (such as bread, potatoes, and rice), and milk and milk products contain carbohydrates. Carbohydrates are important for health. But when you eat too many at once, your blood glucose can go too high. This is even more likely if you don't have or take enough insulin forthat food.   Some carbohydrates may raise blood glucose more than others. These include potatoes, sweets, and white bread. Better choices are less processed foods with more fiber and nutrients. Good options are 100% whole-wheat bread, oatmeal,brown rice, and nonstarchy vegetables.   Learn to use food labels that show added sugar. And try to find healthierchoices, particularly if you are overweight.    Food and medicine  Insulin helps glucose move from the blood into your muscle cells, where it can be used for energy. Some diabetes medicines that are taken by mouth help you make more insulin. Or they help your insulin work more efficiently. So your medicines and food plan have to work together. If you take insulin shots, you need to be very careful to match the amount of carbohydrates you eat  with your insulin dose. If you have too many carbohydrates without adjusting your insulin dose, your blood glucose might become too high. If you have too few carbohydrates, your blood glucose might be too low. Your healthcare provider ora dietitian can help you match your food choices to your medicine.   Have a meal plan  With certain medicines, it's best to eat the same amount of food at the same time every day. That keeps your glucose levels stable. And it helps your medicine work best. Physical activity is an important way to control blood glucose, too. Try to exercise at the same time every day. That way you can build the extra calories you need for exercise into your meal plan. With othermedicines, you may have more choices about how much you eat and when.   If you want to change your medicine to better fit your lifestyle, talk withyour healthcare provider.   Eat smart  You can eat the same foods as everyone else, but you have to carefully watch for certain details. That’s where your diabetes meal plan comes in. A personal meal plan tells you the time of day to eat meals and snacks, the types of food to eat, and how much. Itshould include your favorite foods. And it should focus on these healthy foods:   Whole grains, such as 100% whole-wheat bread, brown rice, and oatmeal  Nonfat or low-fat dairy products, such as nonfat milk and yogurt (but be sure these products don't have sugar added to make up for the fat removed)  Lean meats, poultry, fish, eggs, and dried beans and peas  Foods and drinks with no added sugar  Fruits and vegetables  At first, it may be helpful to use measuring cups and spoons to make sure you’re really eating the amount of food that’s in your plan. You can also use standardized portion sizes on food labels, such as 1 serving of meat being the size of a deck of cards. By checking your blood glucose 1 to 2 hours after eating, you can learn how your food choicesaffect your blood glucose.   To  create a diabetes meal plan or change a plan that’s not working for you, see a dietitian or diabetes educator. Let them know if you have any new health concerns or if your medicines have changed. Having ameal plan that you can live with will keep you at your healthy best.     © 2874-9379 [employer]. All rights reserved. This information is not intended as a substitute for professional medical care. Always follow yourhealthcare professional's instructions.    Diabetes: Caring for Your Body   When you have diabetes, your body needs special care. This care helps you stay healthy and prevent problems. Exercise and healthy eating are a part of this. You can also protect yourself by taking special care of your feet, skin, teeth,and eyes.   Caring for your feet     Follow these tips to help keep your feet healthy:  Check your feet every day for redness, blisters, cracks, dry skin, or numbness. Use a mirror to check the bottoms of your feet, if needed. Or ask for help.  Wash your feet in warm (not hot) water. Don’t soak them.  Use an emery board to keep your toenails even with the ends of your toes. File away sharp edges. A foot doctor (podiatrist) may need to cut your toenails for you.  Smooth down calluses gently or wait until your next podiatry appointment.  Keep your skin soft and smooth by putting a thin layer of skin lotion on the tops and bottoms of your feet. Don't put lotion in between your toes.  Always wear shoes or slippers, even inside your home. Make sure that shoes are correctly fitted, not too tight and not too loose. This can cause friction and rubbing of your feet. Change your socks daily. Always check shoes for foreign objects before putting them on.  Call your healthcare provider right away if your feet are numb or painful. Also call your provider if a cut or sore doesn’t heal in a few days.  Preventing skin infections   To prevent skin infections, bathe every day, but use a moderate water  temperature.. Dry yourself well, especially between your toes. Try to keep your home on the humid side during the colder months of the year to prevent your skin getting dry. Wash any cuts with warm, soapy water. Cover with a sterile bandage. Call your healthcare provider if a cut or sore doesn't heal in a fewdays, feels warm, itches, is swollen, or has a bad smell.   Caring for your teeth   Follow these guidelines for healthy teeth:  Brush your teeth twice daily.  Floss your teeth daily.  See your dentist at least twice yearly.  Keep your blood sugar in a good range.  Caring for your eyes  Have your eyes checked every year by an optometrist or ophthalmologist. Letyour healthcare provider know if you experience any of the following symptoms:   Blurred vision  Dark spots or \"holes\"  Flashes of light  Seeing more floaters than usual  Poor night vision  If you smoke, quit  Smoking is dangerous for everyone, especially people with diabetes. It can harm the blood vessels in your eyes, kidneys, nerves, and heart. It raises blood pressure. Smoking can also slow healing, so infections are more likely. Askyour healthcare provider about programs to help you stop smoking.   [employer] last reviewed this educational content on12/1/2021 © 2000-2022 [employer]. All rights reserved. This information is not intended as a substitute for professional medical care. Always follow yourhealthcare professional's instructions.

## (undated) NOTE — LETTER
Date: 4/25/2018    Patient Name: Shelli Kelley          To Whom it may concern: The above patient was seen at the [facility] for treatment of a medical condition.     This patient should be excused from attending work from 04/24/2018-04/25

## (undated) NOTE — LETTER
07/24/19    [facility]     In our system Dr. Lakia Diaz is shown to be your primary care provider and you are due for an annual wellness visit. Can you please call our office to schedule appointment.    However, if you are being evaluated by chriss

## (undated) NOTE — LETTER
12/20/17        Vito Coughlin  [id number]      Dear Manjit Ni,    Our records indicate that you have outstanding lab work and or testing that was ordered for you and has not yet been completed:          HIV AG AB Combo [E]

## (undated) NOTE — LETTER
Date: 12/13/2019    Patient Name: Melchor Castañeda          To Whom it may concern: The above patient was seen at the [facility] for treatment of a medical condition.       The patient may return to work/school on 12/16/19 with the foll

## (undated) NOTE — LETTER
04/20/20        Dear Liudmila Rubi,       In our system Dr. Benjamin Espinosa is shown to be your primary care provider, you are due for Due for DM follow up (Colonoscopy, Hgb A1c, Lipids and Foot exam needed).  Can you please call our office to schedule appointm

## (undated) NOTE — LETTER
Date: 5/20/2020    Patient Name: Stacie Mahan          To Whom it may concern: The above patient was evaluated at the [facility] for treatment of a medical condition.       The patient may return to work/school on 5/21/20 with the f

## (undated) NOTE — ED AVS SNAPSHOT
Davis Eagle   MRN: [id number]    Department:  [facility] Emergency Department   Date of Visit:  3/1/2020           Disclosure     Insurance plans vary and the physician(s) referred by the ER may not be covered by your plan.  Please contact tell this physician (or your personal doctor if your instructions are to return to your personal doctor) about any new or lasting problems. The primary care or specialist physician will see patients referred from the [facility] Emergency Department.  Arthor Bernheim

## (undated) NOTE — LETTER
03/29/19        oJdy Le  [id number] [facility]      Dear Oumou Sanches,    Our records indicate that you have outstanding lab work and or testing that was ordered for you and has not yet been completed:  Orders Placed This Encounter

## (undated) NOTE — LETTER
01/28/21          Anna Campbell  [address]   [facility] [id number]            We tried to contact you by phone without success.  In an effort to provide quality patient care, we are reaching out to you to contact the office as so

## (undated) NOTE — LETTER
07/09/18        [facility]  [id number] [facility]      Dear Barbara Mane,    [address] [facility] records indicate that you have outstanding lab work and or testing that was ordered for you and has not yet been completed:          Vitamin D, 25-Hydrox